# Patient Record
Sex: MALE | Race: WHITE | NOT HISPANIC OR LATINO | Employment: FULL TIME | URBAN - METROPOLITAN AREA
[De-identification: names, ages, dates, MRNs, and addresses within clinical notes are randomized per-mention and may not be internally consistent; named-entity substitution may affect disease eponyms.]

---

## 2023-03-21 LAB
ALANINE AMINOTRANSFERASE (SGPT) (U/L) IN SER/PLAS: 55 U/L (ref 10–52)
ALBUMIN (G/DL) IN SER/PLAS: 4.5 G/DL (ref 3.4–5)
ALKALINE PHOSPHATASE (U/L) IN SER/PLAS: 102 U/L (ref 33–120)
ANION GAP IN SER/PLAS: 14 MMOL/L (ref 10–20)
ASPARTATE AMINOTRANSFERASE (SGOT) (U/L) IN SER/PLAS: 34 U/L (ref 9–39)
BILIRUBIN TOTAL (MG/DL) IN SER/PLAS: 0.8 MG/DL (ref 0–1.2)
CALCIDIOL (25 OH VITAMIN D3) (NG/ML) IN SER/PLAS: 42 NG/ML
CALCIUM (MG/DL) IN SER/PLAS: 9.4 MG/DL (ref 8.6–10.3)
CARBON DIOXIDE, TOTAL (MMOL/L) IN SER/PLAS: 24 MMOL/L (ref 21–32)
CHLORIDE (MMOL/L) IN SER/PLAS: 105 MMOL/L (ref 98–107)
CHOLESTEROL (MG/DL) IN SER/PLAS: 199 MG/DL (ref 0–199)
CHOLESTEROL IN HDL (MG/DL) IN SER/PLAS: 44.5 MG/DL
CHOLESTEROL/HDL RATIO: 4.5
CREATININE (MG/DL) IN SER/PLAS: 0.82 MG/DL (ref 0.5–1.3)
GFR MALE: >90 ML/MIN/1.73M2
GLUCOSE (MG/DL) IN SER/PLAS: 98 MG/DL (ref 74–99)
LDL: 116 MG/DL (ref 0–99)
POTASSIUM (MMOL/L) IN SER/PLAS: 3.8 MMOL/L (ref 3.5–5.3)
PROSTATE SPECIFIC ANTIGEN,SCREEN: 2.67 NG/ML (ref 0–4)
PROTEIN TOTAL: 7.4 G/DL (ref 6.4–8.2)
SODIUM (MMOL/L) IN SER/PLAS: 139 MMOL/L (ref 136–145)
TRIGLYCERIDE (MG/DL) IN SER/PLAS: 191 MG/DL (ref 0–149)
UREA NITROGEN (MG/DL) IN SER/PLAS: 11 MG/DL (ref 6–23)
VLDL: 38 MG/DL (ref 0–40)

## 2023-11-24 PROBLEM — R10.13 DYSPEPSIA: Status: ACTIVE | Noted: 2023-11-24

## 2023-11-24 PROBLEM — J30.9 ALLERGIC RHINITIS: Status: ACTIVE | Noted: 2023-11-24

## 2023-11-24 PROBLEM — I10 HYPERTENSION: Status: ACTIVE | Noted: 2023-11-24

## 2023-11-24 PROBLEM — E55.9 VITAMIN D DEFICIENCY: Status: ACTIVE | Noted: 2023-11-24

## 2023-11-24 PROBLEM — E78.5 HYPERLIPIDEMIA: Status: ACTIVE | Noted: 2023-11-24

## 2023-11-24 PROBLEM — N45.1 EPIDIDYMITIS: Status: ACTIVE | Noted: 2023-11-24

## 2024-02-19 ENCOUNTER — OFFICE VISIT (OUTPATIENT)
Dept: PRIMARY CARE | Facility: CLINIC | Age: 54
End: 2024-02-19
Payer: COMMERCIAL

## 2024-02-19 VITALS
HEIGHT: 70 IN | WEIGHT: 230 LBS | BODY MASS INDEX: 32.93 KG/M2 | DIASTOLIC BLOOD PRESSURE: 82 MMHG | HEART RATE: 79 BPM | TEMPERATURE: 97.4 F | SYSTOLIC BLOOD PRESSURE: 124 MMHG

## 2024-02-19 DIAGNOSIS — R53.83 FATIGUE, UNSPECIFIED TYPE: ICD-10-CM

## 2024-02-19 DIAGNOSIS — Z12.5 ENCOUNTER FOR PROSTATE CANCER SCREENING: ICD-10-CM

## 2024-02-19 DIAGNOSIS — E78.5 HYPERLIPIDEMIA, UNSPECIFIED HYPERLIPIDEMIA TYPE: ICD-10-CM

## 2024-02-19 DIAGNOSIS — I10 PRIMARY HYPERTENSION: Primary | ICD-10-CM

## 2024-02-19 DIAGNOSIS — E55.9 VITAMIN D DEFICIENCY: ICD-10-CM

## 2024-02-19 DIAGNOSIS — I15.9 SECONDARY HYPERTENSION: ICD-10-CM

## 2024-02-19 PROCEDURE — 99214 OFFICE O/P EST MOD 30 MIN: CPT | Performed by: FAMILY MEDICINE

## 2024-02-19 PROCEDURE — 3074F SYST BP LT 130 MM HG: CPT | Performed by: FAMILY MEDICINE

## 2024-02-19 PROCEDURE — 90750 HZV VACC RECOMBINANT IM: CPT | Performed by: FAMILY MEDICINE

## 2024-02-19 PROCEDURE — 90471 IMMUNIZATION ADMIN: CPT | Performed by: FAMILY MEDICINE

## 2024-02-19 PROCEDURE — 3079F DIAST BP 80-89 MM HG: CPT | Performed by: FAMILY MEDICINE

## 2024-02-19 PROCEDURE — 1036F TOBACCO NON-USER: CPT | Performed by: FAMILY MEDICINE

## 2024-02-19 RX ORDER — CICLOPIROX 80 MG/ML
SOLUTION TOPICAL NIGHTLY
COMMUNITY

## 2024-02-19 RX ORDER — TERBINAFINE HYDROCHLORIDE 250 MG/1
TABLET ORAL
COMMUNITY

## 2024-02-19 RX ORDER — IRBESARTAN 150 MG/1
150 TABLET ORAL DAILY
Qty: 90 TABLET | Refills: 1 | Status: SHIPPED | OUTPATIENT
Start: 2024-02-19 | End: 2024-05-04

## 2024-02-19 RX ORDER — ALBUTEROL SULFATE 90 UG/1
2 AEROSOL, METERED RESPIRATORY (INHALATION) EVERY 4 HOURS PRN
COMMUNITY
Start: 2022-09-22 | End: 2024-02-19 | Stop reason: WASHOUT

## 2024-02-19 RX ORDER — FLUTICASONE PROPIONATE 50 MCG
1 SPRAY, SUSPENSION (ML) NASAL DAILY PRN
COMMUNITY
Start: 2022-10-16

## 2024-02-19 NOTE — PROGRESS NOTES
"    /82   Pulse 79   Temp 36.3 °C (97.4 °F)   Ht 1.778 m (5' 10\")   Wt 104 kg (230 lb)   BMI 33.00 kg/m²     No past medical history on file.    Patient Active Problem List   Diagnosis    Allergic rhinitis    Dyspepsia    Epididymitis    Hyperlipidemia    Hypertension    Vitamin D deficiency       Current Outpatient Medications   Medication Sig Dispense Refill    aspirin (Aspir-Low) 81 mg EC tablet Take 1 tablet (81 mg) by mouth once daily.      ciclopirox (Penlac) 8 % solution Apply topically once daily at bedtime.      fluticasone (Flonase) 50 mcg/actuation nasal spray Administer 1 spray into each nostril once daily.      irbesartan (Avapro) 150 mg tablet Take 1 tablet (150 mg) by mouth once daily. 90 tablet 0    terbinafine (LamISIL) 250 mg tablet PLEASE SEE ATTACHED FOR DETAILED DIRECTIONS      albuterol 90 mcg/actuation inhaler Inhale 2 puffs every 4 hours if needed.      doxycycline (Vibramycin) 100 mg capsule Take by mouth every 12 hours.      irbesartan (Avapro) 150 mg tablet TAKE 1 TABLET BY MOUTH EVERY DAY (Patient not taking: Reported on 2/19/2024) 30 tablet 5    irbesartan (Avapro) 150 mg tablet Take 1 tablet (150 mg) by mouth once daily.       No current facility-administered medications for this visit.       CC/HPI/ASSESSMENT/PLAN    CC follow-up medication    HPI patient 53-year-old suffers from hypertension with a history of vitamin D deficiency and hyperlipidemia.  Like an order for blood work.  Feeling fatigued experiencing some shortness of breath.  We discussed seeing cardiology to ensure it is not cardiac.  Will do blood work to check blood counts liver kidney sugar cholesterol thyroid testosterone levels.  Patient had colonoscopy 3 years ago.  We discussed dietary changes for weight loss purposes.  Blood pressure is under good control.  Requesting refills for medication.  Patient would like shingle vaccine #2.  ROS negative septa noted above past medical social surgical history is " reviewed    Exam calm vital stable eyes no jaundice neck supple no LAD lungs CTA CV RRR Ext no edema abdomen obese neuro alert oriented CN II through intact no focal neurologic deficits noted    A/P 1.  Hypertension stable meds refilled blood work ordered 2 vitamin D deficiency 3 hyperlipidemia 4 fatigue.  Extensive blood work ordered including testosterone liver kidney sugar cholesterol levels.  Patient will see cardiology for cardiac workup.  Medicine refilled.  Shingle vaccine #2 given.  Dietary changes weight loss discussed.  Will await blood test results follow-up 6 months sooner if abnormalities noted in blood work.  There are no diagnoses linked to this encounter.

## 2024-03-05 ENCOUNTER — OFFICE VISIT (OUTPATIENT)
Dept: CARDIOLOGY | Facility: CLINIC | Age: 54
End: 2024-03-05
Payer: COMMERCIAL

## 2024-03-05 VITALS
DIASTOLIC BLOOD PRESSURE: 78 MMHG | SYSTOLIC BLOOD PRESSURE: 118 MMHG | HEART RATE: 60 BPM | WEIGHT: 222 LBS | BODY MASS INDEX: 31.85 KG/M2

## 2024-03-05 DIAGNOSIS — Z78.9 NEVER SMOKED TOBACCO: ICD-10-CM

## 2024-03-05 DIAGNOSIS — R06.02 SHORTNESS OF BREATH: ICD-10-CM

## 2024-03-05 DIAGNOSIS — E78.2 MIXED HYPERLIPIDEMIA: ICD-10-CM

## 2024-03-05 DIAGNOSIS — I20.9 ANGINA, CLASS III (CMS-HCC): ICD-10-CM

## 2024-03-05 DIAGNOSIS — I10 PRIMARY HYPERTENSION: ICD-10-CM

## 2024-03-05 DIAGNOSIS — R53.82 CHRONIC FATIGUE: ICD-10-CM

## 2024-03-05 DIAGNOSIS — Z01.812 PRE-PROCEDURE LAB EXAM: ICD-10-CM

## 2024-03-05 PROCEDURE — 3074F SYST BP LT 130 MM HG: CPT | Performed by: INTERNAL MEDICINE

## 2024-03-05 PROCEDURE — 3078F DIAST BP <80 MM HG: CPT | Performed by: INTERNAL MEDICINE

## 2024-03-05 PROCEDURE — 1036F TOBACCO NON-USER: CPT | Performed by: INTERNAL MEDICINE

## 2024-03-05 PROCEDURE — 3008F BODY MASS INDEX DOCD: CPT | Performed by: INTERNAL MEDICINE

## 2024-03-05 PROCEDURE — 99204 OFFICE O/P NEW MOD 45 MIN: CPT | Performed by: INTERNAL MEDICINE

## 2024-03-05 PROCEDURE — 93000 ELECTROCARDIOGRAM COMPLETE: CPT | Performed by: INTERNAL MEDICINE

## 2024-03-05 ASSESSMENT — ENCOUNTER SYMPTOMS
CONSTITUTIONAL NEGATIVE: 1
NEUROLOGICAL NEGATIVE: 1
RESPIRATORY NEGATIVE: 1
CARDIOVASCULAR NEGATIVE: 1

## 2024-03-05 NOTE — PROGRESS NOTES
CARDIOLOGY OFFICE VISIT      CHIEF COMPLAINT  Chief Complaint   Patient presents with    New Patient Visit     Fatigue  and shortness of breath        HISTORY OF PRESENT ILLNESS  Kota Bacon is a 53 y.o. 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  He has been doing well and lost to follow-up since then, and was referred back because of recurrent chest pressure as well as increased shortness of breath with minimal exertion.  He denied orthopnea proximal nocturnal dyspnea.  Patient was adopted so is not aware of his family history.  He does not smoke and is not exposed to secondhand smoking.  He has not had any recurrent chest pain in the last few days.  EKG today shows normal sinus rhythm with no acute ST or T wave changes.  Lipids from March 2023 showed cholesterol is 199, HDL is 44, LDL is 116 and triglyceride is 191    ASSESSMENT AND PLAN  1.  Chest pain: Chest pain is concerning for possible angina, with significant exertional component as noted above.  In view of his known history of myocardial bridging, I will recommend repeat cardiac catheterization to rule out any significant coronary artery disease for further evaluation.  In the meantime, we will continue with current medications and baby aspirin.  2.  Dyslipidemia: With suboptimal lipid profile as noted above, will get repeat lipids and LFTs and consider initiation of lipid-lowering therapy if clinically indicated.  3.  Hypertension: Blood pressure is well-controlled current medications which are continued.    Problem List Items Addressed This Visit       Hyperlipidemia    Hypertension    Fatigue    Shortness of breath    BMI 31.0-31.9,adult    Never smoked tobacco    Angina, class III (CMS/HCC)     Other Visit Diagnoses       Pre-procedure lab exam           "      Recent Cardiovascular Testing:    Echo-  Stress-  Cath-  Carotid Ultrasound-    Past Medical History  History reviewed. No pertinent past medical history.    Social History  Social History     Tobacco Use    Smoking status: Never    Smokeless tobacco: Never   Substance Use Topics    Alcohol use: Yes     Comment: casually    Drug use: Never       Family History     Family History   Adopted: Yes        Allergies:  No Known Allergies     Outpatient Medications:  Current Outpatient Medications   Medication Instructions    aspirin (Aspir-Low) 81 mg EC tablet 1 tablet, oral, Daily    ciclopirox (Penlac) 8 % solution Topical, Nightly    fluticasone (Flonase) 50 mcg/actuation nasal spray 1 spray, Each Nostril, Daily    irbesartan (AVAPRO) 150 mg, oral, Daily    terbinafine (LamISIL) 250 mg tablet PLEASE SEE ATTACHED FOR DETAILED DIRECTIONS        Recent Lab Results:    CBC:   Lab Results   Component Value Date    WBC 7.0 03/06/2020    RBC 5.17 03/06/2020    HGB 16.2 03/06/2020    HCT 48.4 03/06/2020     03/06/2020        CMP:    Lab Results   Component Value Date     03/21/2023    K 3.8 03/21/2023     03/21/2023    CO2 24 03/21/2023    BUN 11 03/21/2023    CREATININE 0.82 03/21/2023    GLUCOSE 98 03/21/2023    CALCIUM 9.4 03/21/2023       Magnesium:    No results found for: \"MG\"    Lipid Profile:    Lab Results   Component Value Date    TRIG 191 (H) 03/21/2023    HDL 44.5 03/21/2023       TSH:    No results found for: \"TSH\"    BNP:   No results found for: \"BNP\"     PT/INR:    No results found for: \"PROTIME\", \"INR\"    HgBA1c:    No results found for: \"HGBA1C\"    BMP:  Lab Results   Component Value Date     03/21/2023     03/19/2021     09/21/2020    K 3.8 03/21/2023    K 3.9 03/19/2021    K 3.8 09/21/2020     03/21/2023     03/19/2021     09/21/2020    CO2 24 03/21/2023    CO2 27 03/19/2021    CO2 27 09/21/2020    BUN 11 03/21/2023    BUN 12 03/19/2021    BUN 13 " "09/21/2020    CREATININE 0.82 03/21/2023    CREATININE 0.91 03/19/2021    CREATININE 0.85 09/21/2020       CBC:  Lab Results   Component Value Date    WBC 7.0 03/06/2020    RBC 5.17 03/06/2020    HGB 16.2 03/06/2020    HCT 48.4 03/06/2020    MCV 94 03/06/2020    MCHC 33.5 03/06/2020    RDW 12.3 03/06/2020     03/06/2020       Cardiac Enzymes:    No results found for: \"TROPHS\"    Hepatic Function Panel:    Lab Results   Component Value Date    ALKPHOS 102 03/21/2023    ALT 55 (H) 03/21/2023    AST 34 03/21/2023    PROT 7.4 03/21/2023    BILITOT 0.8 03/21/2023         REVIEW OF SYSTEMS  Review of Systems   Constitutional: Negative.   Cardiovascular: Negative.    Respiratory: Negative.     Neurological: Negative.    All other systems reviewed and are negative.      VITALS  Vitals:    03/05/24 1029   BP: 118/78   Pulse: 60     Wt Readings from Last 4 Encounters:   03/05/24 101 kg (222 lb)   02/19/24 104 kg (230 lb)   04/26/23 103 kg (228 lb)   12/19/22 103 kg (226 lb)       PHYSICAL EXAM  Constitutional:       Appearance: Healthy appearance.   Eyes:      Pupils: Pupils are equal, round, and reactive to light.   Pulmonary:      Effort: Pulmonary effort is normal.      Breath sounds: Normal breath sounds.   Cardiovascular:      PMI at left midclavicular line. Normal rate. Regular rhythm.      Murmurs: There is no murmur.      No gallop.  No click. No rub.   Pulses:     Intact distal pulses.   Musculoskeletal: Normal range of motion.      Cervical back: Normal range of motion. Skin:     General: Skin is warm and dry.   Neurological:      General: No focal deficit present.      Mental Status: Alert and oriented to person, place and time.             "

## 2024-03-18 ENCOUNTER — APPOINTMENT (OUTPATIENT)
Dept: CARDIOLOGY | Facility: HOSPITAL | Age: 54
End: 2024-03-18
Payer: COMMERCIAL

## 2024-03-18 ENCOUNTER — HOSPITAL ENCOUNTER (OUTPATIENT)
Facility: HOSPITAL | Age: 54
Setting detail: OUTPATIENT SURGERY
Discharge: HOME | End: 2024-03-18
Attending: INTERNAL MEDICINE | Admitting: INTERNAL MEDICINE
Payer: COMMERCIAL

## 2024-03-18 VITALS
RESPIRATION RATE: 16 BRPM | SYSTOLIC BLOOD PRESSURE: 120 MMHG | TEMPERATURE: 97.3 F | HEART RATE: 60 BPM | OXYGEN SATURATION: 100 % | BODY MASS INDEX: 32.19 KG/M2 | DIASTOLIC BLOOD PRESSURE: 75 MMHG | WEIGHT: 224.87 LBS | HEIGHT: 70 IN

## 2024-03-18 DIAGNOSIS — R93.1 ABNORMAL FINDINGS ON DIAGNOSTIC IMAGING OF HEART AND CORONARY CIRCULATION: ICD-10-CM

## 2024-03-18 DIAGNOSIS — I25.10 CORONARY ARTERY DISEASE INVOLVING NATIVE CORONARY ARTERY OF NATIVE HEART WITHOUT ANGINA PECTORIS: ICD-10-CM

## 2024-03-18 DIAGNOSIS — I10 PRIMARY HYPERTENSION: ICD-10-CM

## 2024-03-18 DIAGNOSIS — Z95.5 PRESENCE OF STENT IN CORONARY ARTERY: ICD-10-CM

## 2024-03-18 DIAGNOSIS — R06.02 SHORTNESS OF BREATH: Primary | ICD-10-CM

## 2024-03-18 DIAGNOSIS — I20.9 ANGINA, CLASS III (CMS-HCC): ICD-10-CM

## 2024-03-18 DIAGNOSIS — R53.82 CHRONIC FATIGUE: ICD-10-CM

## 2024-03-18 LAB
ACT BLD: 299 SEC (ref 89–169)
ALBUMIN SERPL BCP-MCNC: 4.4 G/DL (ref 3.4–5)
ALP SERPL-CCNC: 94 U/L (ref 33–120)
ALT SERPL W P-5'-P-CCNC: 31 U/L (ref 10–52)
ANION GAP SERPL CALC-SCNC: 13 MMOL/L (ref 10–20)
ANION GAP SERPL CALC-SCNC: 15 MMOL/L (ref 10–20)
AST SERPL W P-5'-P-CCNC: 22 U/L (ref 9–39)
BILIRUB SERPL-MCNC: 0.7 MG/DL (ref 0–1.2)
BUN SERPL-MCNC: 14 MG/DL (ref 6–23)
BUN SERPL-MCNC: 15 MG/DL (ref 6–23)
CALCIUM SERPL-MCNC: 9.5 MG/DL (ref 8.6–10.3)
CALCIUM SERPL-MCNC: 9.5 MG/DL (ref 8.6–10.3)
CHLORIDE SERPL-SCNC: 106 MMOL/L (ref 98–107)
CHLORIDE SERPL-SCNC: 106 MMOL/L (ref 98–107)
CHOLEST SERPL-MCNC: 210 MG/DL (ref 0–199)
CHOLESTEROL/HDL RATIO: 4.9
CO2 SERPL-SCNC: 23 MMOL/L (ref 21–32)
CO2 SERPL-SCNC: 25 MMOL/L (ref 21–32)
CREAT SERPL-MCNC: 0.85 MG/DL (ref 0.5–1.3)
CREAT SERPL-MCNC: 0.89 MG/DL (ref 0.5–1.3)
EGFRCR SERPLBLD CKD-EPI 2021: >90 ML/MIN/1.73M*2
EGFRCR SERPLBLD CKD-EPI 2021: >90 ML/MIN/1.73M*2
ERYTHROCYTE [DISTWIDTH] IN BLOOD BY AUTOMATED COUNT: 11.9 % (ref 11.5–14.5)
GLUCOSE SERPL-MCNC: 109 MG/DL (ref 74–99)
GLUCOSE SERPL-MCNC: 111 MG/DL (ref 74–99)
HCT VFR BLD AUTO: 44.8 % (ref 41–52)
HDLC SERPL-MCNC: 42.7 MG/DL
HGB BLD-MCNC: 15.7 G/DL (ref 13.5–17.5)
LDLC SERPL CALC-MCNC: 112 MG/DL
MCH RBC QN AUTO: 31.9 PG (ref 26–34)
MCHC RBC AUTO-ENTMCNC: 35 G/DL (ref 32–36)
MCV RBC AUTO: 91 FL (ref 80–100)
NON HDL CHOLESTEROL: 167 MG/DL (ref 0–149)
NRBC BLD-RTO: 0 /100 WBCS (ref 0–0)
PLATELET # BLD AUTO: 152 X10*3/UL (ref 150–450)
POTASSIUM SERPL-SCNC: 3.7 MMOL/L (ref 3.5–5.3)
POTASSIUM SERPL-SCNC: 3.7 MMOL/L (ref 3.5–5.3)
PROT SERPL-MCNC: 7.2 G/DL (ref 6.4–8.2)
RBC # BLD AUTO: 4.92 X10*6/UL (ref 4.5–5.9)
SODIUM SERPL-SCNC: 140 MMOL/L (ref 136–145)
SODIUM SERPL-SCNC: 140 MMOL/L (ref 136–145)
TRIGL SERPL-MCNC: 279 MG/DL (ref 0–149)
VLDL: 56 MG/DL (ref 0–40)
WBC # BLD AUTO: 6.3 X10*3/UL (ref 4.4–11.3)

## 2024-03-18 PROCEDURE — 93010 ELECTROCARDIOGRAM REPORT: CPT | Performed by: INTERNAL MEDICINE

## 2024-03-18 PROCEDURE — 2500000004 HC RX 250 GENERAL PHARMACY W/ HCPCS (ALT 636 FOR OP/ED): Performed by: NURSE PRACTITIONER

## 2024-03-18 PROCEDURE — 99153 MOD SED SAME PHYS/QHP EA: CPT | Performed by: INTERNAL MEDICINE

## 2024-03-18 PROCEDURE — 2500000004 HC RX 250 GENERAL PHARMACY W/ HCPCS (ALT 636 FOR OP/ED): Performed by: INTERNAL MEDICINE

## 2024-03-18 PROCEDURE — 93005 ELECTROCARDIOGRAM TRACING: CPT

## 2024-03-18 PROCEDURE — 36415 COLL VENOUS BLD VENIPUNCTURE: CPT | Performed by: NURSE PRACTITIONER

## 2024-03-18 PROCEDURE — C1725 CATH, TRANSLUMIN NON-LASER: HCPCS | Performed by: INTERNAL MEDICINE

## 2024-03-18 PROCEDURE — 80048 BASIC METABOLIC PNL TOTAL CA: CPT | Mod: CCI | Performed by: NURSE PRACTITIONER

## 2024-03-18 PROCEDURE — 80061 LIPID PANEL: CPT | Performed by: NURSE PRACTITIONER

## 2024-03-18 PROCEDURE — 93458 L HRT ARTERY/VENTRICLE ANGIO: CPT | Performed by: INTERNAL MEDICINE

## 2024-03-18 PROCEDURE — 92928 PRQ TCAT PLMT NTRAC ST 1 LES: CPT | Performed by: INTERNAL MEDICINE

## 2024-03-18 PROCEDURE — 2500000001 HC RX 250 WO HCPCS SELF ADMINISTERED DRUGS (ALT 637 FOR MEDICARE OP): Performed by: INTERNAL MEDICINE

## 2024-03-18 PROCEDURE — C1894 INTRO/SHEATH, NON-LASER: HCPCS | Performed by: INTERNAL MEDICINE

## 2024-03-18 PROCEDURE — 7100000010 HC PHASE TWO TIME - EACH INCREMENTAL 1 MINUTE: Performed by: INTERNAL MEDICINE

## 2024-03-18 PROCEDURE — 2500000001 HC RX 250 WO HCPCS SELF ADMINISTERED DRUGS (ALT 637 FOR MEDICARE OP): Performed by: NURSE PRACTITIONER

## 2024-03-18 PROCEDURE — 7100000009 HC PHASE TWO TIME - INITIAL BASE CHARGE: Performed by: INTERNAL MEDICINE

## 2024-03-18 PROCEDURE — 85347 COAGULATION TIME ACTIVATED: CPT | Performed by: INTERNAL MEDICINE

## 2024-03-18 PROCEDURE — 2780000003 HC OR 278 NO HCPCS: Performed by: INTERNAL MEDICINE

## 2024-03-18 PROCEDURE — 80048 BASIC METABOLIC PNL TOTAL CA: CPT | Performed by: NURSE PRACTITIONER

## 2024-03-18 PROCEDURE — 2550000001 HC RX 255 CONTRASTS: Performed by: INTERNAL MEDICINE

## 2024-03-18 PROCEDURE — C9600 PERC DRUG-EL COR STENT SING: HCPCS | Performed by: INTERNAL MEDICINE

## 2024-03-18 PROCEDURE — 85027 COMPLETE CBC AUTOMATED: CPT | Performed by: NURSE PRACTITIONER

## 2024-03-18 PROCEDURE — C1887 CATHETER, GUIDING: HCPCS | Performed by: INTERNAL MEDICINE

## 2024-03-18 PROCEDURE — 99152 MOD SED SAME PHYS/QHP 5/>YRS: CPT | Performed by: INTERNAL MEDICINE

## 2024-03-18 PROCEDURE — 2500000005 HC RX 250 GENERAL PHARMACY W/O HCPCS: Performed by: INTERNAL MEDICINE

## 2024-03-18 PROCEDURE — 85347 COAGULATION TIME ACTIVATED: CPT

## 2024-03-18 PROCEDURE — C1874 STENT, COATED/COV W/DEL SYS: HCPCS | Performed by: INTERNAL MEDICINE

## 2024-03-18 PROCEDURE — 93458 L HRT ARTERY/VENTRICLE ANGIO: CPT | Mod: 59 | Performed by: INTERNAL MEDICINE

## 2024-03-18 PROCEDURE — C1769 GUIDE WIRE: HCPCS | Performed by: INTERNAL MEDICINE

## 2024-03-18 PROCEDURE — 2720000007 HC OR 272 NO HCPCS: Performed by: INTERNAL MEDICINE

## 2024-03-18 DEVICE — STENT ONYXNG40012UX ONYX 4.00X12RX
Type: IMPLANTABLE DEVICE | Site: CORONARY | Status: FUNCTIONAL
Brand: ONYX FRONTIER™

## 2024-03-18 RX ORDER — METOPROLOL TARTRATE 25 MG/1
12.5 TABLET, FILM COATED ORAL ONCE
Status: COMPLETED | OUTPATIENT
Start: 2024-03-18 | End: 2024-03-18

## 2024-03-18 RX ORDER — ACETAMINOPHEN 325 MG/1
650 TABLET ORAL EVERY 6 HOURS PRN
Status: DISCONTINUED | OUTPATIENT
Start: 2024-03-18 | End: 2024-03-18 | Stop reason: HOSPADM

## 2024-03-18 RX ORDER — NITROGLYCERIN 0.4 MG/1
0.4 TABLET SUBLINGUAL EVERY 5 MIN PRN
Qty: 25 TABLET | Refills: 1 | Status: SHIPPED | OUTPATIENT
Start: 2024-03-18

## 2024-03-18 RX ORDER — SODIUM CHLORIDE 9 MG/ML
100 INJECTION, SOLUTION INTRAVENOUS CONTINUOUS
Status: ACTIVE | OUTPATIENT
Start: 2024-03-18 | End: 2024-03-18

## 2024-03-18 RX ORDER — NITROGLYCERIN 0.4 MG/1
0.4 TABLET SUBLINGUAL EVERY 5 MIN PRN
Status: DISCONTINUED | OUTPATIENT
Start: 2024-03-18 | End: 2024-03-18 | Stop reason: HOSPADM

## 2024-03-18 RX ORDER — CLOPIDOGREL BISULFATE 75 MG/1
75 TABLET ORAL DAILY
Status: DISCONTINUED | OUTPATIENT
Start: 2024-03-19 | End: 2024-03-18 | Stop reason: HOSPADM

## 2024-03-18 RX ORDER — ASPIRIN 325 MG
325 TABLET ORAL ONCE
Status: DISCONTINUED | OUTPATIENT
Start: 2024-03-18 | End: 2024-03-18 | Stop reason: HOSPADM

## 2024-03-18 RX ORDER — CLOPIDOGREL BISULFATE 75 MG/1
75 TABLET ORAL DAILY
Qty: 30 TABLET | Refills: 11 | Status: SHIPPED | OUTPATIENT
Start: 2024-03-19 | End: 2024-05-04

## 2024-03-18 RX ORDER — FENTANYL CITRATE 50 UG/ML
INJECTION, SOLUTION INTRAMUSCULAR; INTRAVENOUS AS NEEDED
Status: DISCONTINUED | OUTPATIENT
Start: 2024-03-18 | End: 2024-03-18 | Stop reason: HOSPADM

## 2024-03-18 RX ORDER — ATORVASTATIN CALCIUM 40 MG/1
40 TABLET, FILM COATED ORAL DAILY
Qty: 30 TABLET | Refills: 1 | Status: SHIPPED | OUTPATIENT
Start: 2024-03-18 | End: 2024-05-04

## 2024-03-18 RX ORDER — HEPARIN SODIUM 1000 [USP'U]/ML
INJECTION, SOLUTION INTRAVENOUS; SUBCUTANEOUS AS NEEDED
Status: DISCONTINUED | OUTPATIENT
Start: 2024-03-18 | End: 2024-03-18 | Stop reason: HOSPADM

## 2024-03-18 RX ORDER — LIDOCAINE HYDROCHLORIDE 20 MG/ML
INJECTION, SOLUTION INFILTRATION; PERINEURAL AS NEEDED
Status: DISCONTINUED | OUTPATIENT
Start: 2024-03-18 | End: 2024-03-18 | Stop reason: HOSPADM

## 2024-03-18 RX ORDER — ACETAMINOPHEN 500 MG
5000 TABLET ORAL DAILY
COMMUNITY
End: 2024-03-26 | Stop reason: WASHOUT

## 2024-03-18 RX ORDER — SODIUM CHLORIDE 9 MG/ML
100 INJECTION, SOLUTION INTRAVENOUS CONTINUOUS
Status: DISCONTINUED | OUTPATIENT
Start: 2024-03-18 | End: 2024-03-18

## 2024-03-18 RX ORDER — CLOPIDOGREL BISULFATE 300 MG/1
TABLET, FILM COATED ORAL AS NEEDED
Status: DISCONTINUED | OUTPATIENT
Start: 2024-03-18 | End: 2024-03-18 | Stop reason: HOSPADM

## 2024-03-18 RX ORDER — MIDAZOLAM HYDROCHLORIDE 1 MG/ML
INJECTION, SOLUTION INTRAMUSCULAR; INTRAVENOUS AS NEEDED
Status: DISCONTINUED | OUTPATIENT
Start: 2024-03-18 | End: 2024-03-18 | Stop reason: HOSPADM

## 2024-03-18 RX ORDER — MORPHINE SULFATE 2 MG/ML
2 INJECTION, SOLUTION INTRAMUSCULAR; INTRAVENOUS
Status: DISCONTINUED | OUTPATIENT
Start: 2024-03-18 | End: 2024-03-18 | Stop reason: HOSPADM

## 2024-03-18 RX ORDER — RANOLAZINE 500 MG/1
500 TABLET, EXTENDED RELEASE ORAL ONCE
Status: COMPLETED | OUTPATIENT
Start: 2024-03-18 | End: 2024-03-18

## 2024-03-18 RX ORDER — NITROGLYCERIN 40 MG/100ML
INJECTION INTRAVENOUS AS NEEDED
Status: DISCONTINUED | OUTPATIENT
Start: 2024-03-18 | End: 2024-03-18 | Stop reason: HOSPADM

## 2024-03-18 RX ADMIN — RANOLAZINE 500 MG: 500 TABLET, FILM COATED, EXTENDED RELEASE ORAL at 07:03

## 2024-03-18 RX ADMIN — METOPROLOL TARTRATE 12.5 MG: 25 TABLET, FILM COATED ORAL at 07:03

## 2024-03-18 RX ADMIN — SODIUM CHLORIDE 100 ML/HR: 9 INJECTION, SOLUTION INTRAVENOUS at 07:03

## 2024-03-18 ASSESSMENT — PAIN SCALES - GENERAL

## 2024-03-18 ASSESSMENT — COLUMBIA-SUICIDE SEVERITY RATING SCALE - C-SSRS
1. IN THE PAST MONTH, HAVE YOU WISHED YOU WERE DEAD OR WISHED YOU COULD GO TO SLEEP AND NOT WAKE UP?: NO
2. HAVE YOU ACTUALLY HAD ANY THOUGHTS OF KILLING YOURSELF?: NO
6. HAVE YOU EVER DONE ANYTHING, STARTED TO DO ANYTHING, OR PREPARED TO DO ANYTHING TO END YOUR LIFE?: NO

## 2024-03-18 ASSESSMENT — PAIN - FUNCTIONAL ASSESSMENT: PAIN_FUNCTIONAL_ASSESSMENT: 0-10

## 2024-03-18 NOTE — PROGRESS NOTES
Reviewed findings of cardiac catheterization/PCI and drug-eluting stent placement of proximal left anterior descending artery with patient and his wife who is at the bedside.  Pictures of coronary arteries were reviewed and provided to them.  Reviewed need for medical therapy of coronary artery disease and importance of medication compliance including dual antiplatelet therapy with aspirin and Plavix, initiation of statin, use of sublingual nitroglycerin.  Patient has baseline bradycardia with heart rates in the 50s to low 60s therefore beta-blocker was not initiated.  Reviewed postprocedure activity restrictions.  Long discussion of CAD risk factor modification and multiple questions were answered.  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  He will follow-up with Dr. Marshall for reevaluation from cardiac perspective post procedurally.  They verbalized understanding of this information.

## 2024-03-18 NOTE — NURSING NOTE
Patient arrived back to [facility] from Cath Lab s/p PCI.  On arrival, focused assessment completed and WDL.  Right radial site has a VASC Band on; site is stable; no oozing or hematoma noted.  Patient given water, coffee, and a breakfast bar to hold him over until he has a boxed a lunch.  Patient's wife is bedside.  Patient denies any needs at this time.

## 2024-03-18 NOTE — Clinical Note
Catheter redirected. Received call from patient's spouse regarding scheduling stress test. Stress test was ordered at 3/9/2020 office visit. Test not done due to COVID concerns. Patient is still having fatigue. Discussed with Dr. Laureen Jeans. Verbal order and read back per Giselle Tsang DO 
- order nuclear stress test - fatigue; r/o ischemia 
- schedule follow up appointment in January with Dr. Daniel Vallejo This has been fully explained to the patient's spouse, who indicates understanding.

## 2024-03-18 NOTE — NURSING NOTE
"Patient ambulated in room prior to discharge.  No complaints of dizziness or light-headedness with ambulation.  On discharge, right radial site remained stable; dressing is clean/dry/intact; site is soft; no oozing or hematoma noted.  Discharge instructions were given via \"teach back\" method. Instructions included site care, restrictions, discharge medications (including 3 new medications); cardiac rehab information; and stent information.  Patient verbally states understanding and all follow-up questions answered correctly.  Patient was discharged to car by wheelchair.  "

## 2024-03-18 NOTE — POST-PROCEDURE NOTE
Physician Transition of Care Summary  Invasive Cardiovascular Lab    Procedure Date: 3/18/2024  Attending:    Constantino Oscar - Primary  Resident/Fellow/Other Assistant: Surgeon(s) and Role:    Indications:   Pre-op Diagnosis     * Shortness of breath [R06.02]     * Primary hypertension [I10]     * Chronic fatigue [R53.82]     * Angina, class III (CMS/HCC) [I20.9]    Post-procedure diagnosis:   Post-op Diagnosis     * Shortness of breath [R06.02]     * Primary hypertension [I10]     * Chronic fatigue [R53.82]     * Angina, class III (CMS/HCC) [I20.9]    Procedure(s):     * Left Heart Cath, With LV    * PCI SIMON Stent- Coronary      Procedure Findings:   80% stenosis of proximal LAD, mid LAD bridge, minimal disease of circumflex and the right coronary artery, normal left ventricular function, successful PTCA drug-eluting stents of proximal LAD    Description of the Procedure:   Left heart catheterization coronary angiography left ventriculogram    Complications:   None    Stents/Implants:   Cardiovascular Implants       Stent    Stent, [place] Simon, 4.00 X 12rx - [id number] - Implanted        Inventory item: STENT, BK FRONTIER SIMON, 4.00 X 12RX Model/Cat number: [id number]    : [employer] Lot number: [id number]    Device identifier: [id number]        As of 3/18/2024       Status: Implanted                              Anticoagulation/Antiplatelet Plan:   Intravenous heparin, Plavix load    Estimated Blood Loss:   5 mL    Anesthesia: Moderate Sedation Anesthesia Staff: No anesthesia staff entered.    Any Specimen(s) Removed:   Order Name Source Comment Collection Info Order Time   BASIC METABOLIC PANEL Blood, Venous  Collected By: Jackie Null RN 3/18/2024  6:39 AM     Release result to [facility]   Immediate        CBC Blood, Venous  Collected By: Jackie Null RN 3/18/2024  6:39 AM     Release result to [facility]   Immediate        LIPID PANEL Blood, Venous  Collected By: Jackie Null RN  3/18/2024  9:59 AM     Release result to [facility]   Immediate        COMPREHENSIVE METABOLIC PANEL Blood, Venous  Collected By: Jackie Null RN 3/18/2024 10:00 AM     [facility]   Immediate            Disposition:   Dual antiplatelet therapy      Electronically signed by: Dulce Maria Oscar MD, 3/18/2024 10:10 AM

## 2024-03-18 NOTE — DISCHARGE INSTRUCTIONS
CARDIAC CATHETERIZATION DISCHARGE INSTRUCTIONS     FOR SUDDEN AND SEVERE CHEST PAIN, SHORTNESS OF BREATH, EXCESSIVE BLEEDING, SIGNS OF STROKE, OR CHANGES IN MENTAL STATUS YOU SHOULD CALL 911 IMMEDIATELY.     If your provider has prescribed aspirin and/or clopidogrel (Plavix), or prasugrel (Effient), or ticagrelor (Brilinta), DO NOT STOP THESE MEDICATIONS for any reason without talking to your cardiologist first. If any of these were prescribed, you must take them every day without missing a single dose. If you are getting low on these medications, contact your provider immediately for a refill.     -Please ask for prescription refills on new medications that were prescribed after your procedure at your discharge follow-up appointment    FOR NEXT 24 HOURS    - Upon discharge, you should return home and rest for the remainder of the day and evening. You do not have to stay on bed rest but should not be very active.  It is recommended a responsible adult be with you for the first 24 hours after the procedure.    - No driving for 24 hours after procedure. Please arrange for someone to drive you home from the hospital today.     - Do not operate machinery or use power tools for 24 hours after your procedure.     - Do not make any legal decisions for 24 hours after your procedure.     - Do not drink alcoholic beverages for 24 hours after your procedure.    WOUND CARE      *FOR RADIAL (WRIST) ACCESS*    ·      No lifting more than 5 pounds or excessive use of the wrist for 24 hours - for example, treat your wrist as if it is sprained.  ·      Do not engage in vigorous activities (tennis, golf, bowling, weights) for at least 48 hours after the procedure.  ·      Do not submerge the wrist for 7 days after the procedure.  ·      You should expect mild tingling in your hand and tenderness at the puncture site for up to 3 days.    - The transparent dressing should be removed from the site 24 hours after the procedure.    Dressing can be removed 3/19/24 at 10:15 am    - You may shower the day after your procedure. Wash the site gently with soap and water. Rinse well and pat dry. Keep the area clean and dry. You may apply a Band-Aid to the site. Avoid lotions, ointments, or powders until fully healed.     - It is normal to notice a small bruise around the puncture site and/or a small grape sized or smaller lump. Any large bruising or large lump warrants a call to the office.     - If bleeding should occur, lay down and apply pressure to the affected area for 10 minutes.  If the bleeding stops notify your physician.  If there is a large amount of bleeding or spurting of blood CALL 911 immediately.  DO NOT drive yourself to the hospital.    - You may experience some tenderness, bruising or minimal inflammation.  If you have any concerns, you may contact the Cath Lab or if any of these symptoms become excessive, contact your cardiologist or go to the emergency room.     OTHER INSTRUCTIONS    - You may take acetaminophen (Tylenol) as directed for discomfort.  If pain is not relieved with acetaminophen (Tylenol), contact your doctor.    - If you notice or experience any of the following, you should notify your doctor or seek medical attention  Chest pain or discomfort  Change in mental status or weakness in extremities.  Dizziness, light headedness, or feeling faint.  Change in the site where the procedure was performed, such as bleeding or an increased area of bruising or swelling.  Tingling, numbness, pain, or coolness in the leg/arm beyond the site where the procedure was performed.  Signs of infection (i.e. shaking chills, temperature > 100 degrees Fahrenheit, warmth, redness) in the leg/arm area where the procedure was performed.  Changes in urination   Bloody or black stools  Vomiting blood  Severe nose bleeds  Any excessive bleeding    - If you DO NOT have an appointment with your cardiologist within 2-4 weeks following your  procedure, please contact their office.

## 2024-03-18 NOTE — Clinical Note
Angioplasty of the left anterior descending lesion. Inflation 1: Pressure = 18 shayna; Duration = 13 sec. Inflation 2: Pressure = 18 shayna; Duration = 7 sec.

## 2024-03-18 NOTE — NURSING NOTE
Patient given a boxed meal (turkey sandwich, baked chips, chocolate pudding) as well as another coffee and cranberry juice to drink.  Patient had no complaints of nausea after eating.  Patient does not want to ambulate at this time although he is aware that he can do so at any time.  Patient denies needs at this time.

## 2024-03-18 NOTE — LETTER
March 18, 2024     Patient: Kota Bacon   YOB: 1970   Date of Visit: 3/5/2024       To Whom It May Concern:    It is my medical opinion that Kota Bacon may return to work on Wednesday, 3/27/2024 without restriction .    Sincerely,  JUNIE Yanez-CNP

## 2024-03-19 ASSESSMENT — ENCOUNTER SYMPTOMS
CONSTITUTIONAL NEGATIVE: 1
ENDOCRINE NEGATIVE: 1
RESPIRATORY NEGATIVE: 1
GASTROINTESTINAL NEGATIVE: 1
EYES NEGATIVE: 1
NEUROLOGICAL NEGATIVE: 1
CARDIOVASCULAR NEGATIVE: 1

## 2024-03-19 NOTE — H&P
"History Of Present Illness  Kota Bacon is a 53 y.o. male presenting with chest pain and abnormal stress test.     Past Medical History  Past Medical History:   Diagnosis Date    Hyperlipidemia     Hypertension        Surgical History  Past Surgical History:   Procedure Laterality Date    OTHER SURGICAL HISTORY  01/21/2022    Cardiac catheterization    OTHER SURGICAL HISTORY  01/21/2022    Eye surgery    OTHER SURGICAL HISTORY  01/21/2022    Knee surgery        Social History  He reports that he has never smoked. He has never used smokeless tobacco. He reports current alcohol use. He reports that he does not use drugs.    Family History  Family History   Adopted: Yes        Allergies  Patient has no known allergies.    Review of Systems   Constitutional: Negative.    HENT: Negative.     Eyes: Negative.    Respiratory: Negative.     Cardiovascular: Negative.    Gastrointestinal: Negative.    Endocrine: Negative.    Genitourinary: Negative.    Skin: Negative.    Neurological: Negative.    All other systems reviewed and are negative.       Physical Exam  Constitutional:       Appearance: Normal appearance. He is normal weight.   HENT:      Head: Normocephalic.   Cardiovascular:      Rate and Rhythm: Normal rate and regular rhythm.      Pulses: Normal pulses.      Heart sounds: Normal heart sounds.   Pulmonary:      Effort: Pulmonary effort is normal.      Breath sounds: Normal breath sounds.   Abdominal:      General: Abdomen is flat.   Neurological:      General: No focal deficit present.      Mental Status: He is alert.        Last Recorded Vitals  Blood pressure 120/75, pulse 60, temperature 36.3 °C (97.3 °F), temperature source Temporal, resp. rate 16, height 1.778 m (5' 10\"), weight 102 kg (224 lb 13.9 oz), SpO2 100 %.    Relevant Results             Assessment/Plan   Principal Problem:    Shortness of breath  Active Problems:    Hypertension    Fatigue    Angina, class III (CMS/[facility])      Angina with abnormal " stress test             Dulce Maria Oscar MD

## 2024-03-20 LAB
ATRIAL RATE: 53 BPM
ATRIAL RATE: 63 BPM
P AXIS: 24 DEGREES
P AXIS: 31 DEGREES
P OFFSET: 194 MS
P OFFSET: 203 MS
P ONSET: 140 MS
P ONSET: 147 MS
PR INTERVAL: 142 MS
PR INTERVAL: 152 MS
Q ONSET: 216 MS
Q ONSET: 218 MS
QRS COUNT: 10 BEATS
QRS COUNT: 9 BEATS
QRS DURATION: 86 MS
QRS DURATION: 90 MS
QT INTERVAL: 418 MS
QT INTERVAL: 456 MS
QTC CALCULATION(BAZETT): 427 MS
QTC CALCULATION(BAZETT): 427 MS
QTC FREDERICIA: 424 MS
QTC FREDERICIA: 437 MS
R AXIS: -10 DEGREES
R AXIS: -10 DEGREES
T AXIS: 15 DEGREES
T AXIS: 8 DEGREES
T OFFSET: 427 MS
T OFFSET: 444 MS
VENTRICULAR RATE: 53 BPM
VENTRICULAR RATE: 63 BPM

## 2024-03-20 NOTE — SIGNIFICANT EVENT
Spoke with Pt and he noted some swelling in his hand. Explained the signs and symptoms of bleeding and what to watch for and do. Pt rates nursing care 10/10 and recognized Bell Null RN. She was wonderful!

## 2024-03-26 ENCOUNTER — OFFICE VISIT (OUTPATIENT)
Dept: CARDIOLOGY | Facility: CLINIC | Age: 54
End: 2024-03-26
Payer: COMMERCIAL

## 2024-03-26 VITALS
WEIGHT: 217.4 LBS | BODY MASS INDEX: 31.19 KG/M2 | DIASTOLIC BLOOD PRESSURE: 80 MMHG | HEART RATE: 68 BPM | SYSTOLIC BLOOD PRESSURE: 118 MMHG

## 2024-03-26 DIAGNOSIS — Z95.5 S/P DRUG ELUTING CORONARY STENT PLACEMENT: ICD-10-CM

## 2024-03-26 DIAGNOSIS — I10 PRIMARY HYPERTENSION: ICD-10-CM

## 2024-03-26 DIAGNOSIS — E78.2 MIXED HYPERLIPIDEMIA: ICD-10-CM

## 2024-03-26 DIAGNOSIS — I25.10 CORONARY ARTERY DISEASE INVOLVING NATIVE CORONARY ARTERY OF NATIVE HEART WITHOUT ANGINA PECTORIS: ICD-10-CM

## 2024-03-26 DIAGNOSIS — Z78.9 NEVER SMOKED TOBACCO: ICD-10-CM

## 2024-03-26 PROCEDURE — 1036F TOBACCO NON-USER: CPT | Performed by: INTERNAL MEDICINE

## 2024-03-26 PROCEDURE — 99214 OFFICE O/P EST MOD 30 MIN: CPT | Performed by: INTERNAL MEDICINE

## 2024-03-26 PROCEDURE — 3074F SYST BP LT 130 MM HG: CPT | Performed by: INTERNAL MEDICINE

## 2024-03-26 PROCEDURE — 3008F BODY MASS INDEX DOCD: CPT | Performed by: INTERNAL MEDICINE

## 2024-03-26 PROCEDURE — 3079F DIAST BP 80-89 MM HG: CPT | Performed by: INTERNAL MEDICINE

## 2024-03-26 ASSESSMENT — ENCOUNTER SYMPTOMS
CARDIOVASCULAR NEGATIVE: 1
CONSTITUTIONAL NEGATIVE: 1
RESPIRATORY NEGATIVE: 1
NEUROLOGICAL NEGATIVE: 1

## 2024-03-26 NOTE — PROGRESS NOTES
CARDIOLOGY OFFICE VISIT      CHIEF COMPLAINT  Chief Complaint   Patient presents with    Hospital Follow-up     Radial (wrist )  Still getting chest tightness, feeling shakey, fatigue        HISTORY OF PRESENT ILLNESS  Kota Bacon is a 53 y.o. 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  He was recently seen because of chest pressure with increased shortness of breath with minimal exertion for which she had repeat cardiac catheterization on 3/18/2024 that showed 80% proximal LAD disease treated with drug-eluting stent.  There was mild luminal irregularities in the rest of the coronaries LV function was normal at 55%.  He has been doing well since the procedure with no recurrent chest pain or significant shortness of breath.  He also denies orthopnea proximal nocturnal dyspnea.  Recent lipids from March 2024 showed total cholesterol is 210, HDL is 42, LDL is 112 and triglyceride is 279.  Transaminases are within normal limits.    ASSESSMENT AND PLAN  1.  Coronary artery disease: S/p proximal LAD stent as noted above with no recurrent chest pain.  Will continue with current dual antiplatelet therapy.  2.  Hypertension: Blood pressure is well-controlled on current medications which we will continue.  3.  Dyslipidemia: He was recently started on lipid-lowering therapy, we will repeat his lipids and LFTs prior to his next follow-up.    Problem List Items Addressed This Visit       Hyperlipidemia    Hypertension    BMI 31.0-31.9,adult    Never smoked tobacco    CAD (coronary artery disease)    S/P drug eluting coronary stent placement       Recent Cardiovascular Testing:    Echo-  Stress-  Cath-  Carotid Ultrasound-    Past Medical History  Past Medical History:   Diagnosis Date    Coronary artery disease     Hyperlipidemia     Hypertension        Social History  Social History     Tobacco Use    Smoking status: Never    Smokeless tobacco:  "Never   Vaping Use    Vaping Use: Never used   Substance Use Topics    Alcohol use: Yes     Comment: casually    Drug use: Never       Family History     Family History   Adopted: Yes        Allergies:  No Known Allergies     Outpatient Medications:  Current Outpatient Medications   Medication Instructions    aspirin (Aspir-Low) 81 mg EC tablet 1 tablet, oral, Daily    atorvastatin (LIPITOR) 40 mg, oral, Daily    ciclopirox (Penlac) 8 % solution Topical, Nightly    clopidogrel (PLAVIX) 75 mg, oral, Daily    fluticasone (Flonase) 50 mcg/actuation nasal spray 1 spray, Each Nostril, Daily PRN    irbesartan (AVAPRO) 150 mg, oral, Daily    nitroglycerin (NITROSTAT) 0.4 mg, sublingual, Every 5 min PRN    terbinafine (LamISIL) 250 mg tablet PLEASE SEE ATTACHED FOR DETAILED DIRECTIONS    vit C/zinc citrate/elderberry (SAMBUCUS ELDERBERRY ORAL) 2 tablets, oral, Daily, gummies        Recent Lab Results:    CBC:   Lab Results   Component Value Date    WBC 6.3 03/18/2024    RBC 4.92 03/18/2024    HGB 15.7 03/18/2024    HCT 44.8 03/18/2024     03/18/2024        CMP:    Lab Results   Component Value Date     03/18/2024     03/18/2024    K 3.7 03/18/2024    K 3.7 03/18/2024     03/18/2024     03/18/2024    CO2 25 03/18/2024    CO2 23 03/18/2024    BUN 15 03/18/2024    BUN 14 03/18/2024    CREATININE 0.85 03/18/2024    CREATININE 0.89 03/18/2024    GLUCOSE 109 (H) 03/18/2024    GLUCOSE 111 (H) 03/18/2024    CALCIUM 9.5 03/18/2024    CALCIUM 9.5 03/18/2024       Magnesium:    No results found for: \"MG\"    Lipid Profile:    Lab Results   Component Value Date    TRIG 279 (H) 03/18/2024    HDL 42.7 03/18/2024    LDLCALC 112 (H) 03/18/2024       TSH:    No results found for: \"TSH\"    BNP:   No results found for: \"BNP\"     PT/INR:    No results found for: \"PROTIME\", \"INR\"    HgBA1c:    No results found for: \"HGBA1C\"    BMP:  Lab Results   Component Value Date     03/18/2024     03/18/2024    NA " "139 03/21/2023     03/19/2021     09/21/2020    K 3.7 03/18/2024    K 3.7 03/18/2024    K 3.8 03/21/2023    K 3.9 03/19/2021    K 3.8 09/21/2020     03/18/2024     03/18/2024     03/21/2023     03/19/2021     09/21/2020    CO2 25 03/18/2024    CO2 23 03/18/2024    CO2 24 03/21/2023    CO2 27 03/19/2021    CO2 27 09/21/2020    BUN 15 03/18/2024    BUN 14 03/18/2024    BUN 11 03/21/2023    BUN 12 03/19/2021    BUN 13 09/21/2020    CREATININE 0.85 03/18/2024    CREATININE 0.89 03/18/2024    CREATININE 0.82 03/21/2023    CREATININE 0.91 03/19/2021    CREATININE 0.85 09/21/2020       CBC:  Lab Results   Component Value Date    WBC 6.3 03/18/2024    WBC 7.0 03/06/2020    RBC 4.92 03/18/2024    RBC 5.17 03/06/2020    HGB 15.7 03/18/2024    HGB 16.2 03/06/2020    HCT 44.8 03/18/2024    HCT 48.4 03/06/2020    MCV 91 03/18/2024    MCV 94 03/06/2020    MCH 31.9 03/18/2024    MCHC 35.0 03/18/2024    MCHC 33.5 03/06/2020    RDW 11.9 03/18/2024    RDW 12.3 03/06/2020     03/18/2024     03/06/2020       Cardiac Enzymes:    No results found for: \"TROPHS\"    Hepatic Function Panel:    Lab Results   Component Value Date    ALKPHOS 94 03/18/2024    ALT 31 03/18/2024    AST 22 03/18/2024    PROT 7.2 03/18/2024    BILITOT 0.7 03/18/2024         REVIEW OF SYSTEMS  Review of Systems   Constitutional: Negative.   Cardiovascular: Negative.    Respiratory: Negative.     Neurological: Negative.    All other systems reviewed and are negative.      VITALS  Vitals:    03/26/24 1114   BP: 118/80   Pulse: 68     Wt Readings from Last 4 Encounters:   03/26/24 98.6 kg (217 lb 6.4 oz)   03/18/24 102 kg (224 lb 13.9 oz)   03/05/24 101 kg (222 lb)   02/19/24 104 kg (230 lb)       PHYSICAL EXAM  Constitutional:       Appearance: Healthy appearance.   Eyes:      Pupils: Pupils are equal, round, and reactive to light.   Pulmonary:      Effort: Pulmonary effort is normal.      Breath sounds: Normal " breath sounds.   Cardiovascular:      PMI at left midclavicular line. Normal rate. Regular rhythm.      Murmurs: There is no murmur.      No gallop.  No click. No rub.   Pulses:     Intact distal pulses.   Musculoskeletal: Normal range of motion.      Cervical back: Normal range of motion. Skin:     General: Skin is warm and dry.   Neurological:      General: No focal deficit present.      Mental Status: Alert and oriented to person, place and time.

## 2024-05-02 DIAGNOSIS — I15.9 SECONDARY HYPERTENSION: ICD-10-CM

## 2024-05-02 DIAGNOSIS — I25.10 CORONARY ARTERY DISEASE INVOLVING NATIVE CORONARY ARTERY OF NATIVE HEART WITHOUT ANGINA PECTORIS: ICD-10-CM

## 2024-05-03 NOTE — TELEPHONE ENCOUNTER
Received request for prescription refills for patient.   Patient follows with Dr. Lio Marshall MD      Last OV 3/26/24  Next OV 6/18/24    Pended for signing and sent to provider

## 2024-05-04 RX ORDER — ATORVASTATIN CALCIUM 40 MG/1
40 TABLET, FILM COATED ORAL DAILY
Qty: 90 TABLET | Refills: 3 | Status: SHIPPED | OUTPATIENT
Start: 2024-05-04 | End: 2025-05-04

## 2024-05-04 RX ORDER — IRBESARTAN 150 MG/1
150 TABLET ORAL DAILY
Qty: 90 TABLET | Refills: 3 | Status: SHIPPED | OUTPATIENT
Start: 2024-05-04 | End: 2025-05-04

## 2024-05-04 RX ORDER — CLOPIDOGREL BISULFATE 75 MG/1
75 TABLET ORAL DAILY
Qty: 90 TABLET | Refills: 3 | Status: SHIPPED | OUTPATIENT
Start: 2024-05-04 | End: 2025-05-04

## 2024-06-18 ENCOUNTER — APPOINTMENT (OUTPATIENT)
Dept: CARDIOLOGY | Facility: CLINIC | Age: 54
End: 2024-06-18
Payer: COMMERCIAL

## 2024-06-25 ENCOUNTER — APPOINTMENT (OUTPATIENT)
Dept: CARDIOLOGY | Facility: CLINIC | Age: 54
End: 2024-06-25
Payer: COMMERCIAL

## 2024-06-25 ENCOUNTER — LAB (OUTPATIENT)
Dept: LAB | Facility: LAB | Age: 54
End: 2024-06-25
Payer: COMMERCIAL

## 2024-06-25 VITALS
DIASTOLIC BLOOD PRESSURE: 82 MMHG | HEIGHT: 70 IN | SYSTOLIC BLOOD PRESSURE: 110 MMHG | HEART RATE: 60 BPM | BODY MASS INDEX: 29.73 KG/M2 | WEIGHT: 207.7 LBS

## 2024-06-25 DIAGNOSIS — E55.9 VITAMIN D DEFICIENCY: ICD-10-CM

## 2024-06-25 DIAGNOSIS — Z01.812 PRE-PROCEDURE LAB EXAM: ICD-10-CM

## 2024-06-25 DIAGNOSIS — Z78.9 NEVER SMOKED TOBACCO: ICD-10-CM

## 2024-06-25 DIAGNOSIS — Z95.5 S/P DRUG ELUTING CORONARY STENT PLACEMENT: ICD-10-CM

## 2024-06-25 DIAGNOSIS — E78.5 HYPERLIPIDEMIA, UNSPECIFIED HYPERLIPIDEMIA TYPE: ICD-10-CM

## 2024-06-25 DIAGNOSIS — I10 PRIMARY HYPERTENSION: ICD-10-CM

## 2024-06-25 DIAGNOSIS — R06.02 SHORTNESS OF BREATH: ICD-10-CM

## 2024-06-25 DIAGNOSIS — E78.2 MIXED HYPERLIPIDEMIA: ICD-10-CM

## 2024-06-25 DIAGNOSIS — R53.83 FATIGUE, UNSPECIFIED TYPE: ICD-10-CM

## 2024-06-25 DIAGNOSIS — I20.9 ANGINA, CLASS III (CMS-HCC): ICD-10-CM

## 2024-06-25 DIAGNOSIS — I25.10 CORONARY ARTERY DISEASE INVOLVING NATIVE CORONARY ARTERY OF NATIVE HEART WITHOUT ANGINA PECTORIS: ICD-10-CM

## 2024-06-25 DIAGNOSIS — Z12.5 ENCOUNTER FOR PROSTATE CANCER SCREENING: ICD-10-CM

## 2024-06-25 LAB
25(OH)D3 SERPL-MCNC: 50 NG/ML (ref 30–100)
ALBUMIN SERPL BCP-MCNC: 4.5 G/DL (ref 3.4–5)
ALP SERPL-CCNC: 107 U/L (ref 33–120)
ALT SERPL W P-5'-P-CCNC: 36 U/L (ref 10–52)
ANION GAP SERPL CALC-SCNC: 12 MMOL/L (ref 10–20)
AST SERPL W P-5'-P-CCNC: 25 U/L (ref 9–39)
BILIRUB DIRECT SERPL-MCNC: 0.1 MG/DL (ref 0–0.3)
BILIRUB SERPL-MCNC: 0.7 MG/DL (ref 0–1.2)
BUN SERPL-MCNC: 15 MG/DL (ref 6–23)
CALCIUM SERPL-MCNC: 9.5 MG/DL (ref 8.6–10.3)
CHLORIDE SERPL-SCNC: 106 MMOL/L (ref 98–107)
CHOLEST SERPL-MCNC: 122 MG/DL (ref 0–199)
CHOLESTEROL/HDL RATIO: 3.1
CO2 SERPL-SCNC: 27 MMOL/L (ref 21–32)
CREAT SERPL-MCNC: 0.78 MG/DL (ref 0.5–1.3)
EGFRCR SERPLBLD CKD-EPI 2021: >90 ML/MIN/1.73M*2
ERYTHROCYTE [DISTWIDTH] IN BLOOD BY AUTOMATED COUNT: 11.9 % (ref 11.5–14.5)
GLUCOSE SERPL-MCNC: 102 MG/DL (ref 74–99)
HCT VFR BLD AUTO: 45.6 % (ref 41–52)
HDLC SERPL-MCNC: 39.7 MG/DL
HGB BLD-MCNC: 15.5 G/DL (ref 13.5–17.5)
INR PPP: 1 (ref 0.9–1.1)
LDLC SERPL CALC-MCNC: 57 MG/DL
MCH RBC QN AUTO: 31.6 PG (ref 26–34)
MCHC RBC AUTO-ENTMCNC: 34 G/DL (ref 32–36)
MCV RBC AUTO: 93 FL (ref 80–100)
NON HDL CHOLESTEROL: 82 MG/DL (ref 0–149)
NRBC BLD-RTO: 0 /100 WBCS (ref 0–0)
PLATELET # BLD AUTO: 166 X10*3/UL (ref 150–450)
POTASSIUM SERPL-SCNC: 4 MMOL/L (ref 3.5–5.3)
PROT SERPL-MCNC: 7.1 G/DL (ref 6.4–8.2)
PROTHROMBIN TIME: 11.7 SECONDS (ref 9.8–12.8)
PSA SERPL-MCNC: 3.23 NG/ML
RBC # BLD AUTO: 4.9 X10*6/UL (ref 4.5–5.9)
SODIUM SERPL-SCNC: 141 MMOL/L (ref 136–145)
TRIGL SERPL-MCNC: 129 MG/DL (ref 0–149)
TSH SERPL-ACNC: 2.38 MIU/L (ref 0.44–3.98)
VLDL: 26 MG/DL (ref 0–40)
WBC # BLD AUTO: 5.3 X10*3/UL (ref 4.4–11.3)

## 2024-06-25 PROCEDURE — 84402 ASSAY OF FREE TESTOSTERONE: CPT

## 2024-06-25 PROCEDURE — 80061 LIPID PANEL: CPT

## 2024-06-25 PROCEDURE — 84153 ASSAY OF PSA TOTAL: CPT

## 2024-06-25 PROCEDURE — 3008F BODY MASS INDEX DOCD: CPT | Performed by: INTERNAL MEDICINE

## 2024-06-25 PROCEDURE — 85027 COMPLETE CBC AUTOMATED: CPT

## 2024-06-25 PROCEDURE — 85610 PROTHROMBIN TIME: CPT

## 2024-06-25 PROCEDURE — 99214 OFFICE O/P EST MOD 30 MIN: CPT | Performed by: INTERNAL MEDICINE

## 2024-06-25 PROCEDURE — 82306 VITAMIN D 25 HYDROXY: CPT

## 2024-06-25 PROCEDURE — 84443 ASSAY THYROID STIM HORMONE: CPT

## 2024-06-25 PROCEDURE — 36415 COLL VENOUS BLD VENIPUNCTURE: CPT

## 2024-06-25 PROCEDURE — 3079F DIAST BP 80-89 MM HG: CPT | Performed by: INTERNAL MEDICINE

## 2024-06-25 PROCEDURE — 80053 COMPREHEN METABOLIC PANEL: CPT

## 2024-06-25 PROCEDURE — 82248 BILIRUBIN DIRECT: CPT

## 2024-06-25 PROCEDURE — 3074F SYST BP LT 130 MM HG: CPT | Performed by: INTERNAL MEDICINE

## 2024-06-25 RX ORDER — ONDANSETRON 4 MG/1
1 TABLET, ORALLY DISINTEGRATING ORAL EVERY 8 HOURS PRN
COMMUNITY
Start: 2024-06-01

## 2024-06-25 RX ORDER — CLOTRIMAZOLE AND BETAMETHASONE DIPROPIONATE 10; .64 MG/G; MG/G
CREAM TOPICAL 2 TIMES DAILY
COMMUNITY
Start: 2024-05-09

## 2024-06-25 ASSESSMENT — ENCOUNTER SYMPTOMS
CONSTITUTIONAL NEGATIVE: 1
NEUROLOGICAL NEGATIVE: 1
CARDIOVASCULAR NEGATIVE: 1
RESPIRATORY NEGATIVE: 1

## 2024-06-25 NOTE — PROGRESS NOTES
CARDIOLOGY OFFICE VISIT      CHIEF COMPLAINT  Chief Complaint   Patient presents with    Follow-up     3 month follow up        HISTORY OF PRESENT ILLNESS  Kota Bacon is a 54 y.o. 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  He was recently seen because of chest pressure with increased shortness of breath with minimal exertion for which she had repeat cardiac catheterization on 3/18/2024 that showed 80% proximal LAD disease treated with drug-eluting stent.  There was mild luminal irregularities in the rest of the coronaries LV function was normal at 55%.  He has been doing well since the procedure with no recurrent chest pain or significant shortness of breath.  He also denies orthopnea proximal nocturnal dyspnea.  Recent lipids from March 2024 showed total cholesterol is 210, HDL is 42, LDL is 112 and triglyceride is 279.  Transaminases are within normal limits.    ASSESSMENT AND PLAN  1.  Coronary artery disease: S/p proximal LAD stent as noted above with no recurrent chest pain.  Will continue with current dual antiplatelet therapy.  Patient has missed a few tablets of his DAPT and is encouraged to be more compliant with this.  2.  Hypertension: Blood pressure is well-controlled on current medications which we will continue.  3.  Dyslipidemia: He was recently started on lipid-lowering therapy, we will repeat his lipids and LFTs prior to his next follow-up.    Problem List Items Addressed This Visit          Cardiac and Vasculature    Hyperlipidemia    Hypertension    CAD (coronary artery disease)    S/P drug eluting coronary stent placement       Endocrine/Metabolic    BMI 29.0-29.9,adult       Tobacco    Never smoked tobacco       Recent Cardiovascular Testing:    Echo-  Stress-  Cath-  Carotid Ultrasound-    Past Medical History  Past Medical History:   Diagnosis Date    Coronary artery disease     Hyperlipidemia     Hypertension        Social  "History  Social History     Tobacco Use    Smoking status: Never    Smokeless tobacco: Never   Vaping Use    Vaping status: Never Used   Substance Use Topics    Alcohol use: Yes     Comment: casually, 2x a week    Drug use: Never       Family History     Family History   Adopted: Yes   Family history unknown: Yes        Allergies:  No Known Allergies     Outpatient Medications:  Current Outpatient Medications   Medication Instructions    aspirin (Aspir-Low) 81 mg EC tablet 1 tablet, oral, Daily    atorvastatin (LIPITOR) 40 mg, oral, Daily    ciclopirox (Penlac) 8 % solution Topical, Nightly    clopidogrel (PLAVIX) 75 mg, oral, Daily    clotrimazole-betamethasone (Lotrisone) cream Topical, 2 times daily, to affected area PRN    irbesartan (AVAPRO) 150 mg, oral, Daily    nitroglycerin (NITROSTAT) 0.4 mg, sublingual, Every 5 min PRN    ondansetron ODT (Zofran-ODT) 4 mg disintegrating tablet 1 tablet, oral, Every 8 hours PRN    terbinafine (LamISIL) 250 mg tablet PLEASE SEE ATTACHED FOR DETAILED DIRECTIONS    vit C/zinc citrate/elderberry (SAMBUCUS ELDERBERRY ORAL) 2 tablets, oral, Daily, gummies        Recent Lab Results:    CBC:   Lab Results   Component Value Date    WBC 5.3 06/25/2024    RBC 4.90 06/25/2024    HGB 15.5 06/25/2024    HCT 45.6 06/25/2024     06/25/2024        CMP:    Lab Results   Component Value Date     03/18/2024     03/18/2024    K 3.7 03/18/2024    K 3.7 03/18/2024     03/18/2024     03/18/2024    CO2 25 03/18/2024    CO2 23 03/18/2024    BUN 15 03/18/2024    BUN 14 03/18/2024    CREATININE 0.85 03/18/2024    CREATININE 0.89 03/18/2024    GLUCOSE 109 (H) 03/18/2024    GLUCOSE 111 (H) 03/18/2024    CALCIUM 9.5 03/18/2024    CALCIUM 9.5 03/18/2024       Magnesium:    No results found for: \"MG\"    Lipid Profile:    Lab Results   Component Value Date    TRIG 279 (H) 03/18/2024    HDL 42.7 03/18/2024    LDLCALC 112 (H) 03/18/2024       TSH:    No results found for: " "\"TSH\"    BNP:   No results found for: \"BNP\"     PT/INR:    Lab Results   Component Value Date    PROTIME 11.7 06/25/2024    INR 1.0 06/25/2024       HgBA1c:    No results found for: \"HGBA1C\"    BMP:  Lab Results   Component Value Date     03/18/2024     03/18/2024     03/21/2023     03/19/2021     09/21/2020    K 3.7 03/18/2024    K 3.7 03/18/2024    K 3.8 03/21/2023    K 3.9 03/19/2021    K 3.8 09/21/2020     03/18/2024     03/18/2024     03/21/2023     03/19/2021     09/21/2020    CO2 25 03/18/2024    CO2 23 03/18/2024    CO2 24 03/21/2023    CO2 27 03/19/2021    CO2 27 09/21/2020    BUN 15 03/18/2024    BUN 14 03/18/2024    BUN 11 03/21/2023    BUN 12 03/19/2021    BUN 13 09/21/2020    CREATININE 0.85 03/18/2024    CREATININE 0.89 03/18/2024    CREATININE 0.82 03/21/2023    CREATININE 0.91 03/19/2021    CREATININE 0.85 09/21/2020       CBC:  Lab Results   Component Value Date    WBC 5.3 06/25/2024    WBC 6.3 03/18/2024    WBC 7.0 03/06/2020    RBC 4.90 06/25/2024    RBC 4.92 03/18/2024    RBC 5.17 03/06/2020    HGB 15.5 06/25/2024    HGB 15.7 03/18/2024    HGB 16.2 03/06/2020    HCT 45.6 06/25/2024    HCT 44.8 03/18/2024    HCT 48.4 03/06/2020    MCV 93 06/25/2024    MCV 91 03/18/2024    MCV 94 03/06/2020    MCH 31.6 06/25/2024    MCH 31.9 03/18/2024    MCHC 34.0 06/25/2024    MCHC 35.0 03/18/2024    MCHC 33.5 03/06/2020    RDW 11.9 06/25/2024    RDW 11.9 03/18/2024    RDW 12.3 03/06/2020     06/25/2024     03/18/2024     03/06/2020       Cardiac Enzymes:    No results found for: \"TROPHS\"    Hepatic Function Panel:    Lab Results   Component Value Date    ALKPHOS 94 03/18/2024    ALT 31 03/18/2024    AST 22 03/18/2024    PROT 7.2 03/18/2024    BILITOT 0.7 03/18/2024         REVIEW OF SYSTEMS  Review of Systems   Constitutional: Negative.   Cardiovascular: Negative.    Respiratory: Negative.     Neurological: Negative.    All other " systems reviewed and are negative.      VITALS  Vitals:    06/25/24 1045   BP: 110/82   Pulse: 60     Wt Readings from Last 4 Encounters:   06/25/24 94.2 kg (207 lb 11.2 oz)   03/26/24 98.6 kg (217 lb 6.4 oz)   03/18/24 102 kg (224 lb 13.9 oz)   03/05/24 101 kg (222 lb)       PHYSICAL EXAM  Constitutional:       Appearance: Healthy appearance.   Eyes:      Pupils: Pupils are equal, round, and reactive to light.   Pulmonary:      Effort: Pulmonary effort is normal.      Breath sounds: Normal breath sounds.   Cardiovascular:      PMI at left midclavicular line. Normal rate. Regular rhythm.      Murmurs: There is no murmur.      No gallop.  No click. No rub.   Pulses:     Intact distal pulses.   Musculoskeletal: Normal range of motion.      Cervical back: Normal range of motion. Skin:     General: Skin is warm and dry.   Neurological:      General: No focal deficit present.      Mental Status: Alert and oriented to person, place and time.

## 2024-06-26 ENCOUNTER — TELEPHONE (OUTPATIENT)
Dept: CARDIOLOGY | Facility: CLINIC | Age: 54
End: 2024-06-26
Payer: COMMERCIAL

## 2024-06-26 NOTE — TELEPHONE ENCOUNTER
I called patient and left message per Dr Marshall that his cholesterol levels have markedly improved. Continue present treatment.

## 2024-06-26 NOTE — RESULT ENCOUNTER NOTE
Markedly improved lipid profile with almost 50% reduction in his LDL -continue with current treatment.

## 2024-06-30 LAB
TESTOSTERONE FREE (CHAN): 69.9 PG/ML (ref 35–155)
TESTOSTERONE,TOTAL,LC-MS/MS: 469 NG/DL (ref 250–1100)

## 2024-07-03 ENCOUNTER — TELEPHONE (OUTPATIENT)
Dept: PRIMARY CARE | Facility: CLINIC | Age: 54
End: 2024-07-03
Payer: COMMERCIAL

## 2024-07-03 NOTE — TELEPHONE ENCOUNTER
----- Message from Ernesto Cash MD sent at 7/1/2024  9:43 AM EDT -----  Testosterone liver kidney sugar prostate testing looks good

## 2024-08-19 ENCOUNTER — APPOINTMENT (OUTPATIENT)
Dept: PRIMARY CARE | Facility: CLINIC | Age: 54
End: 2024-08-19
Payer: COMMERCIAL

## 2024-09-26 ENCOUNTER — APPOINTMENT (OUTPATIENT)
Dept: PRIMARY CARE | Facility: CLINIC | Age: 54
End: 2024-09-26
Payer: COMMERCIAL

## 2024-09-26 VITALS
HEART RATE: 60 BPM | HEIGHT: 70 IN | TEMPERATURE: 98 F | BODY MASS INDEX: 29.8 KG/M2 | DIASTOLIC BLOOD PRESSURE: 72 MMHG | SYSTOLIC BLOOD PRESSURE: 110 MMHG

## 2024-09-26 DIAGNOSIS — Z23 IMMUNIZATION DUE: ICD-10-CM

## 2024-09-26 DIAGNOSIS — I10 PRIMARY HYPERTENSION: Primary | ICD-10-CM

## 2024-09-26 DIAGNOSIS — E78.5 HYPERLIPIDEMIA, UNSPECIFIED HYPERLIPIDEMIA TYPE: ICD-10-CM

## 2024-09-26 DIAGNOSIS — J01.00 ACUTE NON-RECURRENT MAXILLARY SINUSITIS: ICD-10-CM

## 2024-09-26 PROCEDURE — 3078F DIAST BP <80 MM HG: CPT | Performed by: FAMILY MEDICINE

## 2024-09-26 PROCEDURE — 90715 TDAP VACCINE 7 YRS/> IM: CPT | Performed by: FAMILY MEDICINE

## 2024-09-26 PROCEDURE — 90471 IMMUNIZATION ADMIN: CPT | Performed by: FAMILY MEDICINE

## 2024-09-26 PROCEDURE — 3074F SYST BP LT 130 MM HG: CPT | Performed by: FAMILY MEDICINE

## 2024-09-26 PROCEDURE — 99214 OFFICE O/P EST MOD 30 MIN: CPT | Performed by: FAMILY MEDICINE

## 2024-09-26 RX ORDER — CEFUROXIME AXETIL 250 MG/1
250 TABLET ORAL 2 TIMES DAILY
Qty: 20 TABLET | Refills: 0 | Status: SHIPPED | OUTPATIENT
Start: 2024-09-26 | End: 2024-10-06

## 2024-09-26 NOTE — PROGRESS NOTES
There were no vitals taken for this visit.    Past Medical History:   Diagnosis Date    Coronary artery disease     Hyperlipidemia     Hypertension        Patient Active Problem List   Diagnosis    Allergic rhinitis    Dyspepsia    Epididymitis    Hyperlipidemia    Hypertension    Vitamin D deficiency    Fatigue    Shortness of breath    BMI 31.0-31.9,adult    Never smoked tobacco    Angina, class III (CMS-HCC)    CAD (coronary artery disease)    S/P drug eluting coronary stent placement    BMI 29.0-29.9,adult       Current Outpatient Medications   Medication Sig Dispense Refill    aspirin (Aspir-Low) 81 mg EC tablet Take 1 tablet (81 mg) by mouth once daily.      atorvastatin (Lipitor) 40 mg tablet Take 1 tablet (40 mg) by mouth once daily. 90 tablet 3    ciclopirox (Penlac) 8 % solution Apply topically once daily at bedtime.      clopidogrel (Plavix) 75 mg tablet Take 1 tablet (75 mg) by mouth once daily. 90 tablet 3    clotrimazole-betamethasone (Lotrisone) cream Apply topically 2 times a day. to affected area PRN      irbesartan (Avapro) 150 mg tablet Take 1 tablet (150 mg) by mouth once daily. 90 tablet 3    nitroglycerin (Nitrostat) 0.4 mg SL tablet Place 1 tablet (0.4 mg) under the tongue every 5 minutes if needed for chest pain. 25 tablet 1    ondansetron ODT (Zofran-ODT) 4 mg disintegrating tablet Take 1 tablet (4 mg) by mouth every 8 hours if needed for nausea.      terbinafine (LamISIL) 250 mg tablet PLEASE SEE ATTACHED FOR DETAILED DIRECTIONS      vit C/zinc citrate/elderberry (SAMBUCUS ELDERBERRY ORAL) Take 2 tablets by mouth once daily. gummies       No current facility-administered medications for this visit.       CC/HPI/ASSESSMENT/PLAN    CC follow-up medicine    HPI patient with a history of hypertension hyperlipidemia.  Patient had myocardial infarction this summer.  He had cardiac catheterization and required stent in the LAD due to 80% blockage.  Patient notes he is feeling significantly  better since having the stent placed.  He has more energy.  He has lost weight.  He is made significant dietary changes.  Patient does note over the last week he is experiencing worsening sinus congestion postnasal drainage thick dark phlegm.  ROS negative set noted above.  Past medical social surgical history is reviewed    Exam calm vital stable eyes no jaundice nares thick discharge neck supple no carotid bruit lungs CTA CV RRR Ext no edema    A/P 1 hypertension chronic stable 2 hyperlipidemia chronic stable continue medications.  #3 maxillary sinusitis antibiotic as ordered.  Patient received a flu shot today.  Dietary changes further weight loss discussed.  I spent in excess of 30 minutes with patient more than 50% of that time was spent discussing his medical history and treatment plans.    There are no diagnoses linked to this encounter.

## 2024-09-27 PROBLEM — J01.00 ACUTE NON-RECURRENT MAXILLARY SINUSITIS: Status: ACTIVE | Noted: 2024-09-27

## 2024-12-02 ENCOUNTER — TELEPHONE (OUTPATIENT)
Dept: CARDIOLOGY | Facility: CLINIC | Age: 54
End: 2024-12-02
Payer: COMMERCIAL

## 2024-12-02 ENCOUNTER — TELEPHONE (OUTPATIENT)
Dept: CARDIOLOGY | Facility: CLINIC | Age: 54
End: 2024-12-02

## 2024-12-02 DIAGNOSIS — I25.10 CORONARY ARTERY DISEASE INVOLVING NATIVE CORONARY ARTERY OF NATIVE HEART WITHOUT ANGINA PECTORIS: ICD-10-CM

## 2024-12-02 DIAGNOSIS — E78.5 HYPERLIPIDEMIA, UNSPECIFIED HYPERLIPIDEMIA TYPE: ICD-10-CM

## 2024-12-02 NOTE — TELEPHONE ENCOUNTER
Patient came in saying he was sent by the lab since there was no order. Spoke to nursing was told patient had them in June and they would be okay for visit next week. Patient insisted that he needed labs prior to visit. Spoke to nursing and they would put in orders for the labs but they would not be signed right away.

## 2024-12-02 NOTE — TELEPHONE ENCOUNTER
Patient arrived to [facility] office today, requesting Lipid and CMP lab orders to be completed prior to next visit with Dr. Joanna MD.     Patient did just have labs done in June- I explained to patient. Patient insists on repeat labs. Orders placed for signature.

## 2024-12-07 ENCOUNTER — LAB (OUTPATIENT)
Dept: LAB | Facility: LAB | Age: 54
End: 2024-12-07
Payer: COMMERCIAL

## 2024-12-07 DIAGNOSIS — I25.10 CORONARY ARTERY DISEASE INVOLVING NATIVE CORONARY ARTERY OF NATIVE HEART WITHOUT ANGINA PECTORIS: ICD-10-CM

## 2024-12-07 DIAGNOSIS — E78.5 HYPERLIPIDEMIA, UNSPECIFIED HYPERLIPIDEMIA TYPE: ICD-10-CM

## 2024-12-07 LAB
ALBUMIN SERPL BCP-MCNC: 4.7 G/DL (ref 3.4–5)
ALP SERPL-CCNC: 94 U/L (ref 33–120)
ALT SERPL W P-5'-P-CCNC: 29 U/L (ref 10–52)
ANION GAP SERPL CALC-SCNC: 11 MMOL/L (ref 10–20)
AST SERPL W P-5'-P-CCNC: 21 U/L (ref 9–39)
BILIRUB SERPL-MCNC: 1.1 MG/DL (ref 0–1.2)
BUN SERPL-MCNC: 17 MG/DL (ref 6–23)
CALCIUM SERPL-MCNC: 9.6 MG/DL (ref 8.6–10.3)
CHLORIDE SERPL-SCNC: 106 MMOL/L (ref 98–107)
CHOLEST SERPL-MCNC: 178 MG/DL (ref 0–199)
CHOLESTEROL/HDL RATIO: 3.6
CO2 SERPL-SCNC: 27 MMOL/L (ref 21–32)
CREAT SERPL-MCNC: 0.87 MG/DL (ref 0.5–1.3)
EGFRCR SERPLBLD CKD-EPI 2021: >90 ML/MIN/1.73M*2
GLUCOSE SERPL-MCNC: 104 MG/DL (ref 74–99)
HDLC SERPL-MCNC: 49.8 MG/DL
LDLC SERPL CALC-MCNC: 96 MG/DL
NON HDL CHOLESTEROL: 128 MG/DL (ref 0–149)
POTASSIUM SERPL-SCNC: 4.1 MMOL/L (ref 3.5–5.3)
PROT SERPL-MCNC: 7.5 G/DL (ref 6.4–8.2)
SODIUM SERPL-SCNC: 140 MMOL/L (ref 136–145)
TRIGL SERPL-MCNC: 162 MG/DL (ref 0–149)
VLDL: 32 MG/DL (ref 0–40)

## 2024-12-07 PROCEDURE — 36415 COLL VENOUS BLD VENIPUNCTURE: CPT

## 2024-12-07 PROCEDURE — 80061 LIPID PANEL: CPT

## 2024-12-07 PROCEDURE — 80053 COMPREHEN METABOLIC PANEL: CPT

## 2024-12-10 ENCOUNTER — APPOINTMENT (OUTPATIENT)
Dept: CARDIOLOGY | Facility: CLINIC | Age: 54
End: 2024-12-10
Payer: COMMERCIAL

## 2024-12-10 VITALS
BODY MASS INDEX: 31.28 KG/M2 | SYSTOLIC BLOOD PRESSURE: 124 MMHG | WEIGHT: 218.5 LBS | DIASTOLIC BLOOD PRESSURE: 84 MMHG | HEART RATE: 67 BPM | HEIGHT: 70 IN

## 2024-12-10 DIAGNOSIS — I10 PRIMARY HYPERTENSION: ICD-10-CM

## 2024-12-10 DIAGNOSIS — I25.10 CORONARY ARTERY DISEASE INVOLVING NATIVE CORONARY ARTERY OF NATIVE HEART WITHOUT ANGINA PECTORIS: ICD-10-CM

## 2024-12-10 DIAGNOSIS — Z78.9 NEVER SMOKED TOBACCO: ICD-10-CM

## 2024-12-10 DIAGNOSIS — R07.89 CHEST DISCOMFORT: ICD-10-CM

## 2024-12-10 DIAGNOSIS — I15.9 SECONDARY HYPERTENSION: ICD-10-CM

## 2024-12-10 DIAGNOSIS — E78.2 MIXED HYPERLIPIDEMIA: ICD-10-CM

## 2024-12-10 DIAGNOSIS — Z95.5 S/P DRUG ELUTING CORONARY STENT PLACEMENT: ICD-10-CM

## 2024-12-10 PROCEDURE — 99214 OFFICE O/P EST MOD 30 MIN: CPT | Performed by: INTERNAL MEDICINE

## 2024-12-10 PROCEDURE — 3074F SYST BP LT 130 MM HG: CPT | Performed by: INTERNAL MEDICINE

## 2024-12-10 PROCEDURE — 3008F BODY MASS INDEX DOCD: CPT | Performed by: INTERNAL MEDICINE

## 2024-12-10 PROCEDURE — 3079F DIAST BP 80-89 MM HG: CPT | Performed by: INTERNAL MEDICINE

## 2024-12-10 PROCEDURE — 1036F TOBACCO NON-USER: CPT | Performed by: INTERNAL MEDICINE

## 2024-12-10 RX ORDER — CLOPIDOGREL BISULFATE 75 MG/1
75 TABLET ORAL DAILY
Qty: 90 TABLET | Refills: 3 | Status: SHIPPED | OUTPATIENT
Start: 2024-12-10 | End: 2025-12-10

## 2024-12-10 RX ORDER — IRBESARTAN 150 MG/1
150 TABLET ORAL DAILY
Qty: 90 TABLET | Refills: 3 | Status: SHIPPED | OUTPATIENT
Start: 2024-12-10 | End: 2025-12-10

## 2024-12-10 RX ORDER — ATORVASTATIN CALCIUM 40 MG/1
40 TABLET, FILM COATED ORAL DAILY
Qty: 90 TABLET | Refills: 3 | Status: SHIPPED | OUTPATIENT
Start: 2024-12-10 | End: 2025-12-10

## 2024-12-10 NOTE — PATIENT INSTRUCTIONS
Continue same medications and treatments.   Patient educated on proper medication use.   Patient educated on risk factor modification.   Please bring any lab results from other providers / physicians to your next appointment.     Please bring all medicines, vitamins, and herbal supplements with you when you come to the office.     Prescriptions will not be filled unless you are compliant with your follow up appointments or have a follow up appointment scheduled as per instruction of your physician. Refills should be requested at the time of your visit.  CMP and Lipid   MPX stress test soon  March office visit

## 2024-12-10 NOTE — PROGRESS NOTES
CARDIOLOGY OFFICE VISIT      CHIEF COMPLAINT  Chief Complaint   Patient presents with    Follow-up     6 months        HISTORY OF PRESENT ILLNESS  Kota Bacon is a 54 y.o. 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  He was recently seen because of chest pressure with increased shortness of breath with minimal exertion for which she had repeat cardiac catheterization on 3/18/2024 that showed 80% proximal LAD disease treated with drug-eluting stent.  There was mild luminal irregularities in the rest of the coronaries LV function was normal at 55%.      He recently came back from vacation and unfortunately gained some weight.  He complains of some intermittent chest pressure which sometimes seems to occur with exertion.  He denies orthopnea proximal dyspnea.    ASSESSMENT AND PLAN  1.  Coronary artery disease: S/p proximal LAD stent as noted above with no recurrent chest pain.  Will continue with current dual antiplatelet therapy.  In view of her complaints of recurrent chest pain, will get a stress test with myocardial perfusion for further evaluation or follow-up based on results.  2.  Hypertension: Blood pressure is well-controlled on current medications which we will continue.  3.  Dyslipidemia: Continue with her current lipid-lowering therapy    Problem List Items Addressed This Visit          Cardiac and Vasculature    Hyperlipidemia    Relevant Orders    Lipid panel    Hypertension    Relevant Medications    irbesartan (Avapro) 150 mg tablet    CAD (coronary artery disease)    Relevant Medications    atorvastatin (Lipitor) 40 mg tablet    clopidogrel (Plavix) 75 mg tablet    Other Relevant Orders    Nuclear Stress Test    Comprehensive metabolic panel    S/P drug eluting coronary stent placement    Relevant Orders    Nuclear Stress Test    Chest discomfort    Relevant Orders    Nuclear Stress Test       Endocrine/Metabolic    BMI 31.0-31.9,adult  "      Tobacco    Never smoked tobacco       Recent Cardiovascular Testing:    Echo-  Stress-  Cath-  Carotid Ultrasound-    Past Medical History  Past Medical History:   Diagnosis Date    Coronary artery disease     Hyperlipidemia     Hypertension        Social History  Social History     Tobacco Use    Smoking status: Never    Smokeless tobacco: Never   Vaping Use    Vaping status: Never Used   Substance Use Topics    Alcohol use: Yes     Comment: casually, 2x a week    Drug use: Never       Family History     Family History   Adopted: Yes   Family history unknown: Yes        Allergies:  No Known Allergies     Outpatient Medications:  Current Outpatient Medications   Medication Instructions    aspirin (Aspir-Low) 81 mg EC tablet 1 tablet, Daily    atorvastatin (LIPITOR) 40 mg, oral, Daily    ciclopirox (Penlac) 8 % solution Nightly    clopidogrel (PLAVIX) 75 mg, oral, Daily    clotrimazole-betamethasone (Lotrisone) cream 2 times daily    irbesartan (AVAPRO) 150 mg, oral, Daily    nitroglycerin (NITROSTAT) 0.4 mg, sublingual, Every 5 min PRN    ondansetron ODT (Zofran-ODT) 4 mg disintegrating tablet 1 tablet, Every 8 hours PRN    terbinafine (LamISIL) 250 mg tablet PLEASE SEE ATTACHED FOR DETAILED DIRECTIONS    vit C/zinc citrate/elderberry (SAMBUCUS ELDERBERRY ORAL) 2 tablets, Daily        Recent Lab Results:    CBC:   Lab Results   Component Value Date    WBC 5.3 06/25/2024    RBC 4.90 06/25/2024    HGB 15.5 06/25/2024    HCT 45.6 06/25/2024     06/25/2024        CMP:    Lab Results   Component Value Date     12/07/2024    K 4.1 12/07/2024     12/07/2024    CO2 27 12/07/2024    BUN 17 12/07/2024    CREATININE 0.87 12/07/2024    GLUCOSE 104 (H) 12/07/2024    CALCIUM 9.6 12/07/2024       Magnesium:    No results found for: \"MG\"    Lipid Profile:    Lab Results   Component Value Date    TRIG 162 (H) 12/07/2024    HDL 49.8 12/07/2024    LDLCALC 96 12/07/2024       TSH:    Lab Results   Component " "Value Date    TSH 2.38 06/25/2024       BNP:   No results found for: \"BNP\"     PT/INR:    Lab Results   Component Value Date    PROTIME 11.7 06/25/2024    INR 1.0 06/25/2024       HgBA1c:    No results found for: \"HGBA1C\"    BMP:  Lab Results   Component Value Date     12/07/2024     06/25/2024     03/18/2024     03/18/2024    K 4.1 12/07/2024    K 4.0 06/25/2024    K 3.7 03/18/2024    K 3.7 03/18/2024     12/07/2024     06/25/2024     03/18/2024     03/18/2024    CO2 27 12/07/2024    CO2 27 06/25/2024    CO2 25 03/18/2024    CO2 23 03/18/2024    BUN 17 12/07/2024    BUN 15 06/25/2024    BUN 15 03/18/2024    BUN 14 03/18/2024    CREATININE 0.87 12/07/2024    CREATININE 0.78 06/25/2024    CREATININE 0.85 03/18/2024    CREATININE 0.89 03/18/2024       CBC:  Lab Results   Component Value Date    WBC 5.3 06/25/2024    WBC 6.3 03/18/2024    WBC 7.0 03/06/2020    RBC 4.90 06/25/2024    RBC 4.92 03/18/2024    RBC 5.17 03/06/2020    HGB 15.5 06/25/2024    HGB 15.7 03/18/2024    HGB 16.2 03/06/2020    HCT 45.6 06/25/2024    HCT 44.8 03/18/2024    HCT 48.4 03/06/2020    MCV 93 06/25/2024    MCV 91 03/18/2024    MCV 94 03/06/2020    MCH 31.6 06/25/2024    MCH 31.9 03/18/2024    MCHC 34.0 06/25/2024    MCHC 35.0 03/18/2024    MCHC 33.5 03/06/2020    RDW 11.9 06/25/2024    RDW 11.9 03/18/2024    RDW 12.3 03/06/2020     06/25/2024     03/18/2024     03/06/2020       Cardiac Enzymes:    No results found for: \"TROPHS\"    Hepatic Function Panel:    Lab Results   Component Value Date    ALKPHOS 94 12/07/2024    ALT 29 12/07/2024    AST 21 12/07/2024    PROT 7.5 12/07/2024    BILITOT 1.1 12/07/2024    BILIDIR 0.1 06/25/2024         REVIEW OF SYSTEMS  Review of Systems   Constitutional: Negative.   Cardiovascular: Negative.    Respiratory: Negative.     Neurological: Negative.    All other systems reviewed and are negative.      VITALS  Vitals:    12/10/24 1113   BP: " 124/84   Pulse: 67     Wt Readings from Last 4 Encounters:   12/10/24 99.1 kg (218 lb 8 oz)   06/25/24 94.2 kg (207 lb 11.2 oz)   03/26/24 98.6 kg (217 lb 6.4 oz)   03/18/24 102 kg (224 lb 13.9 oz)       PHYSICAL EXAM  Constitutional:       Appearance: Healthy appearance.   Eyes:      Pupils: Pupils are equal, round, and reactive to light.   Pulmonary:      Effort: Pulmonary effort is normal.      Breath sounds: Normal breath sounds.   Cardiovascular:      PMI at left midclavicular line. Normal rate. Regular rhythm.      Murmurs: There is no murmur.      No gallop.  No click. No rub.   Pulses:     Intact distal pulses.   Musculoskeletal: Normal range of motion.      Cervical back: Normal range of motion. Skin:     General: Skin is warm and dry.   Neurological:      General: No focal deficit present.      Mental Status: Alert and oriented to person, place and time.

## 2024-12-26 ENCOUNTER — HOSPITAL ENCOUNTER (OUTPATIENT)
Dept: RADIOLOGY | Facility: CLINIC | Age: 54
Discharge: HOME | End: 2024-12-26
Payer: COMMERCIAL

## 2024-12-26 ENCOUNTER — HOSPITAL ENCOUNTER (OUTPATIENT)
Dept: CARDIOLOGY | Facility: CLINIC | Age: 54
Discharge: HOME | End: 2024-12-26
Payer: COMMERCIAL

## 2024-12-26 DIAGNOSIS — Z95.5 S/P DRUG ELUTING CORONARY STENT PLACEMENT: ICD-10-CM

## 2024-12-26 DIAGNOSIS — R07.89 CHEST DISCOMFORT: ICD-10-CM

## 2024-12-26 DIAGNOSIS — I25.10 CORONARY ARTERY DISEASE INVOLVING NATIVE CORONARY ARTERY OF NATIVE HEART WITHOUT ANGINA PECTORIS: ICD-10-CM

## 2024-12-26 PROCEDURE — 93017 CV STRESS TEST TRACING ONLY: CPT

## 2024-12-26 PROCEDURE — 78452 HT MUSCLE IMAGE SPECT MULT: CPT

## 2024-12-26 PROCEDURE — 3430000001 HC RX 343 DIAGNOSTIC RADIOPHARMACEUTICALS: Performed by: INTERNAL MEDICINE

## 2024-12-26 PROCEDURE — A9502 TC99M TETROFOSMIN: HCPCS | Performed by: INTERNAL MEDICINE

## 2025-01-03 ENCOUNTER — TELEPHONE (OUTPATIENT)
Dept: CARDIOLOGY | Facility: CLINIC | Age: 55
End: 2025-01-03
Payer: COMMERCIAL

## 2025-01-03 NOTE — TELEPHONE ENCOUNTER
----- Message from Lio Marshall sent at 12/31/2024 11:48 AM EST -----  This is a normal stress test with normal LV function.  Recommend routine follow-up.

## 2025-01-08 NOTE — TELEPHONE ENCOUNTER
Patient called office back and was advised of normal stress test and to keep follow up as scheduled in the Spring.

## 2025-01-08 NOTE — TELEPHONE ENCOUNTER
Called and left message requesting that pt return call regarding recent testing results, tasked to [place]

## 2025-03-11 ENCOUNTER — APPOINTMENT (OUTPATIENT)
Dept: CARDIOLOGY | Facility: CLINIC | Age: 55
End: 2025-03-11
Payer: COMMERCIAL

## 2025-03-11 VITALS
WEIGHT: 218.6 LBS | SYSTOLIC BLOOD PRESSURE: 114 MMHG | BODY MASS INDEX: 31.3 KG/M2 | DIASTOLIC BLOOD PRESSURE: 78 MMHG | HEART RATE: 64 BPM | HEIGHT: 70 IN

## 2025-03-11 DIAGNOSIS — I10 ESSENTIAL HYPERTENSION, BENIGN: ICD-10-CM

## 2025-03-11 DIAGNOSIS — I25.10 CORONARY ARTERY DISEASE INVOLVING NATIVE CORONARY ARTERY OF NATIVE HEART WITHOUT ANGINA PECTORIS: ICD-10-CM

## 2025-03-11 DIAGNOSIS — Z95.5 S/P DRUG ELUTING CORONARY STENT PLACEMENT: ICD-10-CM

## 2025-03-11 DIAGNOSIS — I25.10 CORONARY ARTERY DISEASE INVOLVING NATIVE HEART, UNSPECIFIED VESSEL OR LESION TYPE, UNSPECIFIED WHETHER ANGINA PRESENT: ICD-10-CM

## 2025-03-11 DIAGNOSIS — E78.5 HYPERLIPIDEMIA, UNSPECIFIED HYPERLIPIDEMIA TYPE: ICD-10-CM

## 2025-03-11 DIAGNOSIS — Z78.9 NEVER SMOKED TOBACCO: ICD-10-CM

## 2025-03-11 PROCEDURE — 99214 OFFICE O/P EST MOD 30 MIN: CPT | Performed by: INTERNAL MEDICINE

## 2025-03-11 PROCEDURE — 3008F BODY MASS INDEX DOCD: CPT | Performed by: INTERNAL MEDICINE

## 2025-03-11 PROCEDURE — 3074F SYST BP LT 130 MM HG: CPT | Performed by: INTERNAL MEDICINE

## 2025-03-11 PROCEDURE — 3078F DIAST BP <80 MM HG: CPT | Performed by: INTERNAL MEDICINE

## 2025-03-11 RX ORDER — ITRACONAZOLE 100 MG/1
200 CAPSULE ORAL 2 TIMES DAILY
COMMUNITY

## 2025-03-11 RX ORDER — NITROGLYCERIN 0.4 MG/1
0.4 TABLET SUBLINGUAL EVERY 5 MIN PRN
Qty: 25 TABLET | Refills: 5 | Status: SHIPPED | OUTPATIENT
Start: 2025-03-11

## 2025-03-11 NOTE — PROGRESS NOTES
CARDIOLOGY OFFICE VISIT      CHIEF COMPLAINT  Chief Complaint   Patient presents with    Follow-up     3 mos CAD        HISTORY OF PRESENT ILLNESS  Kota Bacon is a 54 y.o. year old male patient with hypertension, dyslipidemia and myocardial bridge in mid LAD for which she had a cardiac catheterization in March 2024 that showed 80% proximal LAD disease treated with a drug-eluting stent.  There was mild disease in the rest of the arteries and EF was normal.    He complained of some chest pain for which she had a repeat stress test in December 2024 which was normal with no evidence for ischemia or myocardial infarction.  EF was normal.  He has been doing well, denying any recurrent chest pain palpitations presyncope or syncope    ASSESSMENT AND PLAN  1.  CAD status post proximal LAD stent as noted above with no recurrent chest pain and follow-up stress test which is normal as noted above.  Since patient has completed 1 year of dual antiplatelet therapy, we will discontinue Plavix at this time and recommend lifelong aspirin.  2.  Hypertension: Blood pressure is well-controlled on current dose of irbesartan which we will continue.  3.  Dyslipidemia: I personally reviewed his lipid profile which is acceptable, continue with Lipitor at 40 mg daily.    Diagnoses and all orders for this visit:  Essential hypertension, benign  Coronary artery disease involving native heart, unspecified vessel or lesion type, unspecified whether angina present  S/P drug eluting coronary stent placement  Hyperlipidemia, unspecified hyperlipidemia type  BMI 31.0-31.9,adult  Never smoked tobacco  Coronary artery disease involving native coronary artery of native heart without angina pectoris  -     nitroglycerin (Nitrostat) 0.4 mg SL tablet; Place 1 tablet (0.4 mg) under the tongue every 5 minutes if needed for chest pain. Max of 3 doses      Recent Cardiovascular Testing:    Echo-  Stress-  Cath-  Carotid Ultrasound-    Past Medical  "History  Past Medical History:   Diagnosis Date    Coronary artery disease     Hyperlipidemia     Hypertension        Social History  Social History     Tobacco Use    Smoking status: Never    Smokeless tobacco: Never   Vaping Use    Vaping status: Never Used   Substance Use Topics    Alcohol use: Yes     Comment: casually, 2x a week    Drug use: Never       Family History     Family History   Adopted: Yes   Family history unknown: Yes        Allergies:  No Known Allergies     Outpatient Medications:  Current Outpatient Medications   Medication Instructions    aspirin (Aspir-Low) 81 mg EC tablet 1 tablet, Daily    atorvastatin (LIPITOR) 40 mg, oral, Daily    ciclopirox (Penlac) 8 % solution Nightly    clopidogrel (PLAVIX) 75 mg, oral, Daily    clotrimazole-betamethasone (Lotrisone) cream 2 times daily    irbesartan (AVAPRO) 150 mg, oral, Daily    itraconazole (SPORANOX) 200 mg, oral, 2 times daily, For 7 days, then off for 21 days, then repeat 3/11/25-CURRENTLY ON HOLD due to interaction with Plavix from pharmacist    nitroglycerin (NITROSTAT) 0.4 mg, sublingual, Every 5 min PRN    ondansetron ODT (Zofran-ODT) 4 mg disintegrating tablet 1 tablet, Every 8 hours PRN    vit C/zinc citrate/elderberry (SAMBUCUS ELDERBERRY ORAL) 2 tablets, Daily        Recent Lab Results:  I have personally reviewed the below noted labs;    CBC:   Lab Results   Component Value Date    WBC 5.3 06/25/2024    RBC 4.90 06/25/2024    HGB 15.5 06/25/2024    HCT 45.6 06/25/2024     06/25/2024        CMP:    Lab Results   Component Value Date     12/07/2024    K 4.1 12/07/2024     12/07/2024    CO2 27 12/07/2024    BUN 17 12/07/2024    CREATININE 0.87 12/07/2024    GLUCOSE 104 (H) 12/07/2024    CALCIUM 9.6 12/07/2024       Magnesium:    No results found for: \"MG\"    Lipid Profile:    Lab Results   Component Value Date    TRIG 162 (H) 12/07/2024    HDL 49.8 12/07/2024    LDLCALC 96 12/07/2024       TSH:    Lab Results " "  Component Value Date    TSH 2.38 06/25/2024       BNP:   No results found for: \"BNP\"     PT/INR:    Lab Results   Component Value Date    PROTIME 11.7 06/25/2024    INR 1.0 06/25/2024       HgBA1c:    No results found for: \"HGBA1C\"    BMP:  Lab Results   Component Value Date     12/07/2024     06/25/2024     03/18/2024     03/18/2024    K 4.1 12/07/2024    K 4.0 06/25/2024    K 3.7 03/18/2024    K 3.7 03/18/2024     12/07/2024     06/25/2024     03/18/2024     03/18/2024    CO2 27 12/07/2024    CO2 27 06/25/2024    CO2 25 03/18/2024    CO2 23 03/18/2024    BUN 17 12/07/2024    BUN 15 06/25/2024    BUN 15 03/18/2024    BUN 14 03/18/2024    CREATININE 0.87 12/07/2024    CREATININE 0.78 06/25/2024    CREATININE 0.85 03/18/2024    CREATININE 0.89 03/18/2024       CBC:  Lab Results   Component Value Date    WBC 5.3 06/25/2024    WBC 6.3 03/18/2024    WBC 7.0 03/06/2020    RBC 4.90 06/25/2024    RBC 4.92 03/18/2024    RBC 5.17 03/06/2020    HGB 15.5 06/25/2024    HGB 15.7 03/18/2024    HGB 16.2 03/06/2020    HCT 45.6 06/25/2024    HCT 44.8 03/18/2024    HCT 48.4 03/06/2020    MCV 93 06/25/2024    MCV 91 03/18/2024    MCV 94 03/06/2020    MCH 31.6 06/25/2024    MCH 31.9 03/18/2024    MCHC 34.0 06/25/2024    MCHC 35.0 03/18/2024    MCHC 33.5 03/06/2020    RDW 11.9 06/25/2024    RDW 11.9 03/18/2024    RDW 12.3 03/06/2020     06/25/2024     03/18/2024     03/06/2020       Cardiac Enzymes:    No results found for: \"TROPHS\"    Hepatic Function Panel:    Lab Results   Component Value Date    ALKPHOS 94 12/07/2024    ALT 29 12/07/2024    AST 21 12/07/2024    PROT 7.5 12/07/2024    BILITOT 1.1 12/07/2024    BILIDIR 0.1 06/25/2024         REVIEW OF SYSTEMS  Review of Systems   All other systems reviewed and are negative.      VITALS  Vitals:    03/11/25 1114   BP: 114/78   Pulse: 64     Wt Readings from Last 4 Encounters:   03/11/25 99.2 kg (218 lb 9.6 oz) "   12/10/24 99.1 kg (218 lb 8 oz)   06/25/24 94.2 kg (207 lb 11.2 oz)   03/26/24 98.6 kg (217 lb 6.4 oz)       PHYSICAL EXAM  Constitutional:       Appearance: Healthy appearance. Not in distress.   Neck:      Vascular: No JVR. JVD normal.   Pulmonary:      Effort: Pulmonary effort is normal.      Breath sounds: Normal breath sounds. No wheezing. No rhonchi. No rales.   Chest:      Chest wall: Not tender to palpatation.   Cardiovascular:      PMI at left midclavicular line. Normal rate. Regular rhythm. Normal S1. Normal S2.       Murmurs: There is no murmur.      No gallop.  No click. No rub.   Pulses:     Intact distal pulses.   Edema:     Peripheral edema absent.   Abdominal:      General: Bowel sounds are normal.      Palpations: Abdomen is soft.      Tenderness: There is no abdominal tenderness.   Musculoskeletal: Normal range of motion.         General: No tenderness. Skin:     General: Skin is warm and dry.   Neurological:      General: No focal deficit present.      Mental Status: Alert and oriented to person, place and time.             IRonna LPN am scribing for, and in the presence of Dr. Lio Marshall MD.    I, Dr. Lio Marshall MD, personally performed the services described in the documentation as scribed by Ronna Watson LPN in my presence, and confirm it is both accurate and complete.      Dr. Lio Marshall MD   Thank you for allowing me to participate in the care of this patient. Please do not hesitate to contact me with any further questions or concerns.

## 2025-03-11 NOTE — PATIENT INSTRUCTIONS
Continue same medications and treatments.   Patient educated on proper medication use.   Patient educated on risk factor modification.   Please bring any lab results from other providers / physicians to your next appointment.     Please bring all medicines, vitamins, and herbal supplements with you when you come to the office.     Prescriptions will not be filled unless you are compliant with your follow up appointments or have a follow up appointment scheduled as per instruction of your physician. Refills should be requested at the time of your visit.    STOP TAKING YOUR PLAVIX (CLOPIDOGREL), FINISH WHAT YOU HAVE    FOLLOW UP IN 6 MONTHS    Ronna LI LPN, am scribing for and in the presence of Dr. Lio Marshall MD

## 2025-03-28 ENCOUNTER — APPOINTMENT (OUTPATIENT)
Dept: PRIMARY CARE | Facility: CLINIC | Age: 55
End: 2025-03-28
Payer: COMMERCIAL

## 2025-04-28 ENCOUNTER — APPOINTMENT (OUTPATIENT)
Dept: PRIMARY CARE | Facility: CLINIC | Age: 55
End: 2025-04-28
Payer: COMMERCIAL

## 2025-04-28 VITALS
TEMPERATURE: 98.2 F | SYSTOLIC BLOOD PRESSURE: 108 MMHG | HEIGHT: 70 IN | HEART RATE: 63 BPM | BODY MASS INDEX: 31.81 KG/M2 | DIASTOLIC BLOOD PRESSURE: 70 MMHG | WEIGHT: 222.2 LBS

## 2025-04-28 DIAGNOSIS — Z12.5 ENCOUNTER FOR PROSTATE CANCER SCREENING: ICD-10-CM

## 2025-04-28 DIAGNOSIS — E55.9 VITAMIN D DEFICIENCY: ICD-10-CM

## 2025-04-28 DIAGNOSIS — E78.5 HYPERLIPIDEMIA, UNSPECIFIED HYPERLIPIDEMIA TYPE: ICD-10-CM

## 2025-04-28 DIAGNOSIS — I10 ESSENTIAL HYPERTENSION, BENIGN: Primary | ICD-10-CM

## 2025-04-28 DIAGNOSIS — I25.10 CORONARY ARTERY DISEASE INVOLVING NATIVE HEART, UNSPECIFIED VESSEL OR LESION TYPE, UNSPECIFIED WHETHER ANGINA PRESENT: ICD-10-CM

## 2025-04-28 PROCEDURE — 3074F SYST BP LT 130 MM HG: CPT | Performed by: FAMILY MEDICINE

## 2025-04-28 PROCEDURE — 3078F DIAST BP <80 MM HG: CPT | Performed by: FAMILY MEDICINE

## 2025-04-28 PROCEDURE — 3008F BODY MASS INDEX DOCD: CPT | Performed by: FAMILY MEDICINE

## 2025-04-28 PROCEDURE — 99214 OFFICE O/P EST MOD 30 MIN: CPT | Performed by: FAMILY MEDICINE

## 2025-04-28 RX ORDER — CLOPIDOGREL BISULFATE 75 MG/1
TABLET ORAL
COMMUNITY
Start: 2025-04-24

## 2025-04-28 NOTE — PROGRESS NOTES
"    /70   Pulse 63   Temp 36.8 °C (98.2 °F)   Ht 1.765 m (5' 9.5\")   Wt 101 kg (222 lb 3.2 oz)   BMI 32.34 kg/m²     Medical History[1]    Problem List[2]    Current Medications[3]    CC/HPI/ASSESSMENT/PLAN    CC follow-up medication    HPI patient with a history of hypertension hyperlipidemia coronary artery disease.  Patient's had stents placed.  Medications reviewed.  Blood pressure is under reasonable control.  His cholesterol levels under good control.  Patient notes he is feeling well.  Denies fever chills chest pain palpitation short of breath.  Patient is concerned regarding carotid artery disease.  We discussed pursuing carotid ultrasound.  Healthy diet regular exercise discussed.  ROS negative some note above    Exam calm eyes no jaundice neck supple no carotid bruit lungs CTA CV RRR Ext no edema skin no rash    A/P 1.  Hypertension chronic stable 2 hyperlipidemia chronic stable 3 coronary artery disease chronic stable.  Patient's status post stent placement.  Healthy diet regular exercise discussed.  Carotid ultrasound is ordered.  Extensive blood work is ordered.  Medicines reviewed.  Follow-up 6 months.  I spent excess 30 minutes with patient more than 50% of that time was spent in counseling discussion with regards to his medications health concerns and testing that was ordered.  Follow-up 6 months    There are no diagnoses linked to this encounter.          [1]   Past Medical History:  Diagnosis Date    Coronary artery disease     Hyperlipidemia     Hypertension    [2]   Patient Active Problem List  Diagnosis    Allergic rhinitis    Dyspepsia    Epididymitis    Hyperlipidemia    Essential hypertension, benign    Vitamin D deficiency    Fatigue    Shortness of breath    BMI 31.0-31.9,adult    Never smoked tobacco    Angina, class III (CMS-HCC)    CAD (coronary artery disease)    S/P drug eluting coronary stent placement    BMI 29.0-29.9,adult    Acute non-recurrent maxillary sinusitis    " Chest discomfort   [3]   Current Outpatient Medications   Medication Sig Dispense Refill    aspirin (Aspir-Low) 81 mg EC tablet Take 1 tablet (81 mg) by mouth once daily.      atorvastatin (Lipitor) 40 mg tablet Take 1 tablet (40 mg) by mouth once daily. 90 tablet 3    ciclopirox (Penlac) 8 % solution Apply topically once daily at bedtime.      clopidogrel (Plavix) 75 mg tablet       clotrimazole-betamethasone (Lotrisone) cream Apply topically 2 times a day. to affected area PRN      irbesartan (Avapro) 150 mg tablet Take 1 tablet (150 mg) by mouth once daily. 90 tablet 3    itraconazole (Sporanox) 100 mg capsule Take 2 capsules (200 mg) by mouth 2 times a day. For 7 days, then off for 21 days, then repeat 3/11/25-CURRENTLY ON HOLD due to interaction with Plavix from pharmacist      nitroglycerin (Nitrostat) 0.4 mg SL tablet Place 1 tablet (0.4 mg) under the tongue every 5 minutes if needed for chest pain. Max of 3 doses 25 tablet 5    ondansetron ODT (Zofran-ODT) 4 mg disintegrating tablet Dissolve 1 tablet (4 mg) in the mouth every 8 hours if needed for nausea.      vit C/zinc citrate/elderberry (SAMBUCUS ELDERBERRY ORAL) Take 2 tablets by mouth once daily. gummies       No current facility-administered medications for this visit.

## 2025-04-29 ENCOUNTER — HOSPITAL ENCOUNTER (OUTPATIENT)
Dept: CARDIOLOGY | Facility: CLINIC | Age: 55
Discharge: HOME | End: 2025-04-29
Payer: COMMERCIAL

## 2025-04-29 DIAGNOSIS — I10 ESSENTIAL HYPERTENSION, BENIGN: ICD-10-CM

## 2025-04-29 DIAGNOSIS — I25.10 CORONARY ARTERY DISEASE INVOLVING NATIVE HEART, UNSPECIFIED VESSEL OR LESION TYPE, UNSPECIFIED WHETHER ANGINA PRESENT: ICD-10-CM

## 2025-04-29 DIAGNOSIS — Z13.6 ENCOUNTER FOR SCREENING FOR CARDIOVASCULAR DISORDERS: ICD-10-CM

## 2025-04-29 PROCEDURE — 93880 EXTRACRANIAL BILAT STUDY: CPT

## 2025-04-29 PROCEDURE — 93880 EXTRACRANIAL BILAT STUDY: CPT | Performed by: INTERNAL MEDICINE

## 2025-04-30 ENCOUNTER — TELEPHONE (OUTPATIENT)
Dept: PRIMARY CARE | Facility: CLINIC | Age: 55
End: 2025-04-30
Payer: COMMERCIAL

## 2025-04-30 NOTE — RESULT ENCOUNTER NOTE
Carotid ultrasound report reviewed.  Both right and left carotids have some plaque however it is less than 50%, this is very acceptable like we talked in office.

## 2025-04-30 NOTE — TELEPHONE ENCOUNTER
----- Message from Ernesto Cash sent at 4/30/2025  9:29 AM EDT -----  Carotid ultrasound report reviewed.  Both right and left carotids have some plaque however it is less than 50%, this is very acceptable like we talked in office.  ----- Message -----  From: Cami, Syng[facility] - Cardiology Results In  Sent: 4/29/2025   3:22 PM EDT  To: Ernesto Cash MD

## 2025-09-16 ENCOUNTER — APPOINTMENT (OUTPATIENT)
Dept: CARDIOLOGY | Facility: CLINIC | Age: 55
End: 2025-09-16
Payer: COMMERCIAL

## 2025-11-03 ENCOUNTER — APPOINTMENT (OUTPATIENT)
Dept: PRIMARY CARE | Facility: CLINIC | Age: 55
End: 2025-11-03
Payer: COMMERCIAL

## (undated) DEVICE — GUIDEWIRE, UNIVERSAL BALANCE MID WEIGHT, 190CM, STR

## (undated) DEVICE — INFLATION DEVICE, COPILOT VALVE AND 20/30 INDEFLATOR

## (undated) DEVICE — CATHETER, OPTITORQUE, 5FR, JACKY, 3.5/ 2H/110CM, CURVED

## (undated) DEVICE — BAND, VASCULAR, RADIAL HEMOSTAT, EXTRA-LONG 29CM

## (undated) DEVICE — CATHETER, BALLOON, NC EUPHORA NONCOMPLIANT 4.0 X 12 X 142CM

## (undated) DEVICE — CATHETER, GUIDING, HEARTRAIL III, 6 FR IKARI LEFT 3.75

## (undated) DEVICE — SHEATH, GLIDESHEATH, SLENDER, 6FR 10CM

## (undated) DEVICE — TUBING, MANIFOLD, LOW PRESSURE